# Patient Record
Sex: MALE | Race: WHITE | Employment: FULL TIME | URBAN - METROPOLITAN AREA
[De-identification: names, ages, dates, MRNs, and addresses within clinical notes are randomized per-mention and may not be internally consistent; named-entity substitution may affect disease eponyms.]

---

## 2022-04-26 ENCOUNTER — HOSPITAL ENCOUNTER (EMERGENCY)
Age: 47
Discharge: HOME | End: 2022-04-26
Attending: EMERGENCY MEDICINE
Payer: COMMERCIAL

## 2022-04-26 VITALS
DIASTOLIC BLOOD PRESSURE: 74 MMHG | HEART RATE: 98 BPM | RESPIRATION RATE: 16 BRPM | WEIGHT: 280 LBS | OXYGEN SATURATION: 98 % | HEIGHT: 72 IN | BODY MASS INDEX: 37.93 KG/M2 | TEMPERATURE: 98.2 F | SYSTOLIC BLOOD PRESSURE: 126 MMHG

## 2022-04-26 DIAGNOSIS — L02.31 LEFT BUTTOCK ABSCESS: Primary | ICD-10-CM

## 2022-04-26 PROCEDURE — 99283 EMERGENCY DEPT VISIT LOW MDM: CPT

## 2022-04-26 RX ORDER — SULFAMETHOXAZOLE AND TRIMETHOPRIM 800; 160 MG/1; MG/1
1 TABLET ORAL 2 TIMES DAILY
Qty: 20 TABLET | Refills: 0 | Status: ON HOLD | OUTPATIENT
Start: 2022-04-26 | End: 2022-05-02 | Stop reason: HOSPADM

## 2022-04-26 ASSESSMENT — PAIN DESCRIPTION - LOCATION: LOCATION: RECTUM

## 2022-04-26 ASSESSMENT — PAIN DESCRIPTION - FREQUENCY: FREQUENCY: CONTINUOUS

## 2022-04-26 ASSESSMENT — PAIN DESCRIPTION - PAIN TYPE: TYPE: ACUTE PAIN

## 2022-04-26 ASSESSMENT — LIFESTYLE VARIABLES: HOW OFTEN DO YOU HAVE A DRINK CONTAINING ALCOHOL: NEVER

## 2022-04-26 ASSESSMENT — PAIN SCALES - GENERAL: PAINLEVEL_OUTOF10: 5

## 2022-04-26 ASSESSMENT — PAIN DESCRIPTION - ORIENTATION: ORIENTATION: MID

## 2022-04-26 NOTE — ED NOTES
Reviewed discharge instructions with patient and family. All voice understanding/deny questions. Wheelchair offered, declines. Ambulates without difficulty.        Simone Barth RN  04/26/22 [id number]

## 2022-04-26 NOTE — ED PROVIDER NOTES
EMERGENCY DEPARTMENT ENCOUNTER    Patient: Karen Clark  MRN: [id number]  : 1975  Date of Evaluation: 2022  ED Provider:  Khushboo Coates MD    CHIEF COMPLAINT  Chief Complaint   Patient presents with    Hemorrhoids     rectal bleeding, and pain       HPI  Karen Clark is a 55 y.o. male who presents with an area of pain, redness and swelling localized to the left buttock which has developed over the last several days. .  The duration has been constant since the onset. The pain is mild to moderate in severity and aggravated with palpation. There are no alleviating factors. Denies any other associated symptoms or complaints or concerns. REVIEW OF SYSTEMS    Constitutional: negative for fever, chills  Neurological: negative for HA, focal weakness, loss of sensation  Ophthalmic: negative for vision change  ENT: negative for congestion, rhinorrhea  Cardiovascular: negative for chest pain  Respiratory: negative for SOB, cough  GI: negative for abdominal pain, nausea, vomiting, diarrhea, constipation  : negative for dysuria, hematuria  Musculoskeletal: negative for myalgias, decreased ROM, joint swelling  Dermatological: negative for itching  Heme: Negative for bleeding, bruising      PAST MEDICAL HISTORY  History reviewed. No pertinent past medical history. CURRENT MEDICATIONS  [unfilled]    ALLERGIES  No Known Allergies    SURGICAL HISTORY  History reviewed. No pertinent surgical history. FAMILY HISTORY  History reviewed. No pertinent family history.     SOCIAL HISTORY  Social History     Socioeconomic History    Marital status: Single     Spouse name: None    Number of children: None    Years of education: None    Highest education level: None   Occupational History    None   Tobacco Use    Smoking status: Current Every Day Smoker     Packs/day: 0.50     Types: E-Cigarettes, Cigarettes    Smokeless tobacco: None   Vaping Use    Vaping Use: Never used Substance and Sexual Activity    Alcohol use: Yes    Drug use: No    Sexual activity: Yes   Other Topics Concern    None   Social History Narrative    None     Social Determinants of Health     Financial Resource Strain:     Difficulty of Paying Living Expenses: Not on file   Food Insecurity:     Worried About Running Out of Food in the Last Year: Not on file    Kem of Food in the Last Year: Not on file   Transportation Needs:     Lack of Transportation (Medical): Not on file    Lack of Transportation (Non-Medical):  Not on file   Physical Activity:     Days of Exercise per Week: Not on file    Minutes of Exercise per Session: Not on file   Stress:     Feeling of Stress : Not on file   Social Connections:     Frequency of Communication with Friends and Family: Not on file    Frequency of Social Gatherings with Friends and Family: Not on file    Attends Yarsanism Services: Not on file    Active Member of [address] [place] [address] [employer] or Organizations: Not on file    Attends Club or Organization Meetings: Not on file    Marital Status: Not on file   Intimate Partner Violence:     Fear of Current or Ex-Partner: Not on file    Emotionally Abused: Not on file    Physically Abused: Not on file    Sexually Abused: Not on file   Housing Stability:     Unable to Pay for Housing in the Last Year: Not on file    Number of [place] in the Last Year: Not on file    Unstable Housing in the Last Year: Not on file         **Past medical, family and social histories, and nursing notes reviewed and verified by me**      PHYSICAL EXAM  VITAL SIGNS:   ED Triage Vitals   Enc Vitals Group      BP 04/26/22 0735 126/74      Pulse 04/26/22 0735 120      Resp 04/26/22 0735 16      Temp 04/26/22 0734 98.2 °F (36.8 °C)      Temp Source 04/26/22 0734 Oral      SpO2 04/26/22 0735 98 %      Weight 04/26/22 0740 280 lb (127 kg)      Height 04/26/22 0740 6' (1.829 m)      Head Circumference --       Peak Flow --       Pain Score -- Pain Loc --       Pain Edu? --       Excl. in [address] during ED course were reviewed and are as charted. Constitutional: Minimal distress, Non-toxic appearance  Eyes: Conjunctiva normal, No discharge  HENT: Normocephalic, Atraumatic, oropharynx moist  Neck: Supple, no stridor, no grossly visible or palpable masses  Cardiovascular: Regular rate and rhythm, No murmurs, No rubs, No gallops  Pulmonary/Chest: Normal breath sounds, No respiratory distress or accessory muscle use, No wheezing, crackles or rhonchi. Abdomen: Soft, nondistended and nonrigid, No tenderness or peritoneal signs, No masses  Rectal (performed with nurse present as chaperone): There is a 3 cm x 3 cm area of erythema with about half centimeter sosa the center of this with some central fluctuance on the left buttock just slightly going into the gluteal cleft. Just gentle pressure caused this to open up and drain on its own about 2 cc of purulent fluid. There is no crepitus. There is no extension of this towards the anus or into the perineum or elsewhere. Otherwise normal appearance of the anus. No internal or external masses noted. No evidence for fissures. Normal rectal tone. Stool is normal in color  Back: No midline point tenderness, No paraspinous muscle tenderness. No CVA tenderness  Extremities: No gross deformities, no edema, no tenderness  Neurologic: Normal motor function, Normal sensory function, No focal deficits  Skin: Female fluctuance of the left buttock as noted above under rectal exam, otherwise skin elsewhere is warm, Dry, No erythema, No rash, No cyanosis, No mottling  Psychiatric: Alert and oriented x3, Affect normal        RADIOLOGY/PROCEDURES/LABS/MEDICATIONS ADMINISTERED:    I have reviewed and interpreted all of the currently available lab results from this visit (if applicable):  No results found for this visit on 04/26/22.        ABNORMAL LABS:  Labs Reviewed - No data to display      IMAGING STUDIES ORDERED:  ED NURSING COMMUNICATION      No orders to display         MEDICATIONS ADMINISTERED:  Medications - No data to display        COURSE & MEDICAL DECISION MAKING  Last vitals: /74   Pulse 98   Temp 98.2 °F (36.8 °C) (Oral)   Resp 16   Ht 6' (1.829 m)   Wt 280 lb (127 kg)   SpO2 98%   BMI 37.97 kg/m²     55year-old male with small abscess to left buttock. No clinical evidence that would suggest perianal perirectal abscess or perineal abscess. No clinical evidence to suggest acute necrotizing infection. The abscess began to drain on its own with just gentle pressure and did not require further incision and drainage. Differential diagnoses included, but were not limited to, cellulitis, erysipelas, necrotizing fasciitis,  aseptic cyst, and malignancy. I will add Bactrim. Additional workup and treatment in the ED as documented above. Patient reassured and will be discharged home. I have explained to the patient in appropriate terminology our work-up in the ED and their diagnosis. I have also given anticipatory guidance and expectant management of their condition as an outpatient as per my custom. The patient was given clear discharge and follow-up instructions including return to the ER immediately for worsening concerns. The patient has been advised to follow-up with their primary care physician and/or referred physician in the next two to three days or sooner if worsening and to return to the ER immediately as above with any concerns. I provided the patient counseling with regard to my customary list of strict return precautions as well as return precautions specific to the cause for today's emergency department visit. The patient will return under these provided conditions, but should also return for new concerns or further worsening. Pt and/or family understand and agree with plan. Clinical Impression:  1.  Left buttock abscess        Disposition referral (if applicable): Your Primary care provider    Schedule an appointment as soon as possible for a visit       [facility] Emergency Department  [address] [phone]  [phone]    If symptoms worsen      Disposition medications (if applicable):  Discharge Medication List as of 4/26/2022  8:19 AM      START taking these medications    Details   sulfamethoxazole-trimethoprim (BACTRIM DS) 800-160 MG per tablet Take 1 tablet by mouth 2 times daily for 10 days, Disp-20 tablet, R-0Print             ED Provider Disposition Time  DISPOSITION Decision To Discharge 04/26/2022 07:48:54 AM          Electronically signed by: Nunu Deleon M.D., 4/26/2022 8:44 AM      This dictation was created with voice recognition software. While attempts have been made to review the dictation as it is transcribed, on occasion the spoken word can be misinterpreted by the technology leading to omissions or inappropriate words, phrases or sentences.        Kimberly Mello MD  04/26/22 2842

## 2022-04-29 ENCOUNTER — APPOINTMENT (OUTPATIENT)
Dept: CT IMAGING | Age: 47
DRG: 854 | End: 2022-04-29
Payer: COMMERCIAL

## 2022-04-29 ENCOUNTER — HOSPITAL ENCOUNTER (INPATIENT)
Age: 47
LOS: 2 days | Discharge: HOME HEALTH | DRG: 854 | End: 2022-05-02
Attending: EMERGENCY MEDICINE | Admitting: HOSPITALIST
Payer: COMMERCIAL

## 2022-04-29 DIAGNOSIS — L03.317 CELLULITIS OF BUTTOCK: Primary | ICD-10-CM

## 2022-04-29 DIAGNOSIS — L02.91 ABSCESS: ICD-10-CM

## 2022-04-29 DIAGNOSIS — R73.9 HYPERGLYCEMIA: ICD-10-CM

## 2022-04-29 LAB
ALBUMIN SERPL-MCNC: 3.6 GM/DL (ref 3.4–5)
ALP BLD-CCNC: 140 IU/L (ref 40–129)
ALT SERPL-CCNC: 21 U/L (ref 10–40)
ANION GAP SERPL CALCULATED.3IONS-SCNC: 16 MMOL/L (ref 4–16)
AST SERPL-CCNC: 14 IU/L (ref 15–37)
BASOPHILS ABSOLUTE: 0.1 K/CU MM
BASOPHILS RELATIVE PERCENT: 0.6 % (ref 0–1)
BILIRUB SERPL-MCNC: 0.5 MG/DL (ref 0–1)
BUN BLDV-MCNC: 14 MG/DL (ref 6–23)
CALCIUM SERPL-MCNC: 8.8 MG/DL (ref 8.3–10.6)
CHLORIDE BLD-SCNC: 99 MMOL/L (ref 99–110)
CO2: 17 MMOL/L (ref 21–32)
CREAT SERPL-MCNC: 0.7 MG/DL (ref 0.9–1.3)
DIFFERENTIAL TYPE: ABNORMAL
EOSINOPHILS ABSOLUTE: 0.1 K/CU MM
EOSINOPHILS RELATIVE PERCENT: 0.5 % (ref 0–3)
GFR AFRICAN AMERICAN: >60 ML/MIN/1.73M2
GFR NON-AFRICAN AMERICAN: >60 ML/MIN/1.73M2
GLUCOSE BLD-MCNC: 439 MG/DL (ref 70–99)
HCT VFR BLD CALC: 46.8 % (ref 42–52)
HEMOGLOBIN: 15.6 GM/DL (ref 13.5–18)
IMMATURE NEUTROPHIL %: 0.8 % (ref 0–0.43)
LACTATE: 1.5 MMOL/L (ref 0.4–2)
LYMPHOCYTES ABSOLUTE: 2.3 K/CU MM
LYMPHOCYTES RELATIVE PERCENT: 13.9 % (ref 24–44)
MCH RBC QN AUTO: 29.5 PG (ref 27–31)
MCHC RBC AUTO-ENTMCNC: 33.3 % (ref 32–36)
MCV RBC AUTO: 88.6 FL (ref 78–100)
MONOCYTES ABSOLUTE: 1.3 K/CU MM
MONOCYTES RELATIVE PERCENT: 8 % (ref 0–4)
NUCLEATED RBC %: 0 %
PDW BLD-RTO: 11.8 % (ref 11.7–14.9)
PLATELET # BLD: 315 K/CU MM (ref 140–440)
PMV BLD AUTO: 10.5 FL (ref 7.5–11.1)
POTASSIUM SERPL-SCNC: 4 MMOL/L (ref 3.5–5.1)
RBC # BLD: 5.28 M/CU MM (ref 4.6–6.2)
SEGMENTED NEUTROPHILS ABSOLUTE COUNT: 12.7 K/CU MM
SEGMENTED NEUTROPHILS RELATIVE PERCENT: 76.2 % (ref 36–66)
SODIUM BLD-SCNC: 132 MMOL/L (ref 135–145)
TOTAL IMMATURE NEUTOROPHIL: 0.14 K/CU MM
TOTAL NUCLEATED RBC: 0 K/CU MM
TOTAL PROTEIN: 7.2 GM/DL (ref 6.4–8.2)
WBC # BLD: 16.7 K/CU MM (ref 4–10.5)

## 2022-04-29 PROCEDURE — 10061 I&D ABSCESS COMP/MULTIPLE: CPT

## 2022-04-29 PROCEDURE — 87040 BLOOD CULTURE FOR BACTERIA: CPT

## 2022-04-29 PROCEDURE — 2580000003 HC RX 258: Performed by: EMERGENCY MEDICINE

## 2022-04-29 PROCEDURE — 80053 COMPREHEN METABOLIC PANEL: CPT

## 2022-04-29 PROCEDURE — 99285 EMERGENCY DEPT VISIT HI MDM: CPT

## 2022-04-29 PROCEDURE — 96375 TX/PRO/DX INJ NEW DRUG ADDON: CPT

## 2022-04-29 PROCEDURE — 96365 THER/PROPH/DIAG IV INF INIT: CPT

## 2022-04-29 PROCEDURE — 6360000002 HC RX W HCPCS: Performed by: EMERGENCY MEDICINE

## 2022-04-29 PROCEDURE — 93005 ELECTROCARDIOGRAM TRACING: CPT | Performed by: EMERGENCY MEDICINE

## 2022-04-29 PROCEDURE — 83605 ASSAY OF LACTIC ACID: CPT

## 2022-04-29 PROCEDURE — 96361 HYDRATE IV INFUSION ADD-ON: CPT

## 2022-04-29 PROCEDURE — 85025 COMPLETE CBC W/AUTO DIFF WBC: CPT

## 2022-04-29 RX ORDER — ONDANSETRON 2 MG/ML
4 INJECTION INTRAMUSCULAR; INTRAVENOUS ONCE
Status: DISCONTINUED | OUTPATIENT
Start: 2022-04-29 | End: 2022-05-02 | Stop reason: HOSPADM

## 2022-04-29 RX ORDER — 0.9 % SODIUM CHLORIDE 0.9 %
1000 INTRAVENOUS SOLUTION INTRAVENOUS ONCE
Status: COMPLETED | OUTPATIENT
Start: 2022-04-29 | End: 2022-04-30

## 2022-04-29 RX ORDER — MORPHINE SULFATE 4 MG/ML
4 INJECTION, SOLUTION INTRAMUSCULAR; INTRAVENOUS ONCE
Status: DISCONTINUED | OUTPATIENT
Start: 2022-04-29 | End: 2022-05-02 | Stop reason: HOSPADM

## 2022-04-29 RX ADMIN — CEFEPIME HYDROCHLORIDE 2000 MG: 2 INJECTION, POWDER, FOR SOLUTION INTRAVENOUS at 23:46

## 2022-04-29 RX ADMIN — SODIUM CHLORIDE 1000 ML: 9 INJECTION, SOLUTION INTRAVENOUS at 23:10

## 2022-04-29 ASSESSMENT — ENCOUNTER SYMPTOMS
SHORTNESS OF BREATH: 0
EYE PAIN: 0
BACK PAIN: 0
ABDOMINAL PAIN: 0
COUGH: 0
EYE DISCHARGE: 0
NAUSEA: 0
VOMITING: 0
SORE THROAT: 0
RHINORRHEA: 0

## 2022-04-29 ASSESSMENT — PAIN - FUNCTIONAL ASSESSMENT: PAIN_FUNCTIONAL_ASSESSMENT: 0-10

## 2022-04-29 ASSESSMENT — PAIN DESCRIPTION - LOCATION: LOCATION: BUTTOCKS

## 2022-04-29 ASSESSMENT — PAIN SCALES - GENERAL: PAINLEVEL_OUTOF10: 7

## 2022-04-30 ENCOUNTER — APPOINTMENT (OUTPATIENT)
Dept: CT IMAGING | Age: 47
DRG: 854 | End: 2022-04-30
Payer: COMMERCIAL

## 2022-04-30 PROBLEM — L02.31 ABSCESS OF BUTTOCK, LEFT: Status: ACTIVE | Noted: 2022-04-30

## 2022-04-30 LAB
GLUCOSE BLD-MCNC: 245 MG/DL (ref 70–99)
GLUCOSE BLD-MCNC: 251 MG/DL (ref 70–99)
GLUCOSE BLD-MCNC: 255 MG/DL (ref 70–99)
GLUCOSE BLD-MCNC: 325 MG/DL (ref 70–99)

## 2022-04-30 PROCEDURE — 99221 1ST HOSP IP/OBS SF/LOW 40: CPT | Performed by: SURGERY

## 2022-04-30 PROCEDURE — 6370000000 HC RX 637 (ALT 250 FOR IP): Performed by: HOSPITALIST

## 2022-04-30 PROCEDURE — 87075 CULTR BACTERIA EXCEPT BLOOD: CPT

## 2022-04-30 PROCEDURE — 6360000004 HC RX CONTRAST MEDICATION: Performed by: EMERGENCY MEDICINE

## 2022-04-30 PROCEDURE — 94761 N-INVAS EAR/PLS OXIMETRY MLT: CPT

## 2022-04-30 PROCEDURE — 2580000003 HC RX 258: Performed by: EMERGENCY MEDICINE

## 2022-04-30 PROCEDURE — 83036 HEMOGLOBIN GLYCOSYLATED A1C: CPT

## 2022-04-30 PROCEDURE — 46040 I&D ISCHIORCT&/PERIRCT ABSC: CPT | Performed by: SURGERY

## 2022-04-30 PROCEDURE — 2500000003 HC RX 250 WO HCPCS: Performed by: EMERGENCY MEDICINE

## 2022-04-30 PROCEDURE — 82962 GLUCOSE BLOOD TEST: CPT

## 2022-04-30 PROCEDURE — 6370000000 HC RX 637 (ALT 250 FOR IP): Performed by: INTERNAL MEDICINE

## 2022-04-30 PROCEDURE — 6360000002 HC RX W HCPCS: Performed by: EMERGENCY MEDICINE

## 2022-04-30 PROCEDURE — 1200000000 HC SEMI PRIVATE

## 2022-04-30 PROCEDURE — 96374 THER/PROPH/DIAG INJ IV PUSH: CPT

## 2022-04-30 PROCEDURE — 2500000003 HC RX 250 WO HCPCS: Performed by: SURGERY

## 2022-04-30 PROCEDURE — 6360000002 HC RX W HCPCS: Performed by: HOSPITALIST

## 2022-04-30 PROCEDURE — 74177 CT ABD & PELVIS W/CONTRAST: CPT

## 2022-04-30 PROCEDURE — 2580000003 HC RX 258: Performed by: HOSPITALIST

## 2022-04-30 PROCEDURE — 0D9P0ZZ DRAINAGE OF RECTUM, OPEN APPROACH: ICD-10-PCS | Performed by: SURGERY

## 2022-04-30 PROCEDURE — 87077 CULTURE AEROBIC IDENTIFY: CPT

## 2022-04-30 PROCEDURE — 87070 CULTURE OTHR SPECIMN AEROBIC: CPT

## 2022-04-30 PROCEDURE — 87205 SMEAR GRAM STAIN: CPT

## 2022-04-30 RX ORDER — ENOXAPARIN SODIUM 100 MG/ML
30 INJECTION SUBCUTANEOUS 2 TIMES DAILY
Status: DISCONTINUED | OUTPATIENT
Start: 2022-04-30 | End: 2022-05-02 | Stop reason: HOSPADM

## 2022-04-30 RX ORDER — NICOTINE POLACRILEX 4 MG
15 LOZENGE BUCCAL PRN
Status: DISCONTINUED | OUTPATIENT
Start: 2022-04-30 | End: 2022-04-30 | Stop reason: ALTCHOICE

## 2022-04-30 RX ORDER — DEXTROSE MONOHYDRATE 50 MG/ML
100 INJECTION, SOLUTION INTRAVENOUS PRN
Status: DISCONTINUED | OUTPATIENT
Start: 2022-04-30 | End: 2022-04-30 | Stop reason: SDUPTHER

## 2022-04-30 RX ORDER — INSULIN LISPRO 100 [IU]/ML
0-3 INJECTION, SOLUTION INTRAVENOUS; SUBCUTANEOUS NIGHTLY
Status: DISCONTINUED | OUTPATIENT
Start: 2022-04-30 | End: 2022-05-01

## 2022-04-30 RX ORDER — DEXTROSE MONOHYDRATE 25 G/50ML
12.5 INJECTION, SOLUTION INTRAVENOUS PRN
Status: DISCONTINUED | OUTPATIENT
Start: 2022-04-30 | End: 2022-04-30 | Stop reason: RX

## 2022-04-30 RX ORDER — DEXTROSE MONOHYDRATE 25 G/50ML
12.5 INJECTION, SOLUTION INTRAVENOUS PRN
Status: DISCONTINUED | OUTPATIENT
Start: 2022-04-30 | End: 2022-04-30 | Stop reason: CLARIF

## 2022-04-30 RX ORDER — INSULIN LISPRO 100 [IU]/ML
0-6 INJECTION, SOLUTION INTRAVENOUS; SUBCUTANEOUS
Status: DISCONTINUED | OUTPATIENT
Start: 2022-04-30 | End: 2022-05-01

## 2022-04-30 RX ORDER — LIDOCAINE HYDROCHLORIDE AND EPINEPHRINE BITARTRATE 20; .01 MG/ML; MG/ML
20 INJECTION, SOLUTION SUBCUTANEOUS ONCE
Status: COMPLETED | OUTPATIENT
Start: 2022-04-30 | End: 2022-04-30

## 2022-04-30 RX ORDER — MORPHINE SULFATE 2 MG/ML
2 INJECTION, SOLUTION INTRAMUSCULAR; INTRAVENOUS EVERY 4 HOURS PRN
Status: DISCONTINUED | OUTPATIENT
Start: 2022-04-30 | End: 2022-05-02 | Stop reason: HOSPADM

## 2022-04-30 RX ORDER — ACETAMINOPHEN 650 MG/1
650 SUPPOSITORY RECTAL EVERY 6 HOURS PRN
Status: DISCONTINUED | OUTPATIENT
Start: 2022-04-30 | End: 2022-05-02 | Stop reason: HOSPADM

## 2022-04-30 RX ORDER — LIDOCAINE HYDROCHLORIDE 10 MG/ML
10 INJECTION, SOLUTION EPIDURAL; INFILTRATION; INTRACAUDAL; PERINEURAL ONCE
Status: COMPLETED | OUTPATIENT
Start: 2022-04-30 | End: 2022-04-30

## 2022-04-30 RX ORDER — NICOTINE POLACRILEX 4 MG
15 LOZENGE BUCCAL PRN
Status: DISCONTINUED | OUTPATIENT
Start: 2022-04-30 | End: 2022-04-30 | Stop reason: SDUPTHER

## 2022-04-30 RX ORDER — POLYETHYLENE GLYCOL 3350 17 G/17G
17 POWDER, FOR SOLUTION ORAL DAILY PRN
Status: DISCONTINUED | OUTPATIENT
Start: 2022-04-30 | End: 2022-05-02 | Stop reason: HOSPADM

## 2022-04-30 RX ORDER — FAMOTIDINE 20 MG/1
20 TABLET, FILM COATED ORAL 2 TIMES DAILY
Status: DISCONTINUED | OUTPATIENT
Start: 2022-04-30 | End: 2022-05-02 | Stop reason: HOSPADM

## 2022-04-30 RX ORDER — SODIUM CHLORIDE 9 MG/ML
INJECTION, SOLUTION INTRAVENOUS PRN
Status: DISCONTINUED | OUTPATIENT
Start: 2022-04-30 | End: 2022-05-02 | Stop reason: HOSPADM

## 2022-04-30 RX ORDER — ACETAMINOPHEN 325 MG/1
650 TABLET ORAL EVERY 6 HOURS PRN
Status: DISCONTINUED | OUTPATIENT
Start: 2022-04-30 | End: 2022-05-02 | Stop reason: HOSPADM

## 2022-04-30 RX ORDER — ONDANSETRON 2 MG/ML
4 INJECTION INTRAMUSCULAR; INTRAVENOUS EVERY 6 HOURS PRN
Status: DISCONTINUED | OUTPATIENT
Start: 2022-04-30 | End: 2022-05-02 | Stop reason: HOSPADM

## 2022-04-30 RX ORDER — DEXTROSE MONOHYDRATE 50 MG/ML
100 INJECTION, SOLUTION INTRAVENOUS PRN
Status: DISCONTINUED | OUTPATIENT
Start: 2022-04-30 | End: 2022-05-01 | Stop reason: SDUPTHER

## 2022-04-30 RX ORDER — SODIUM CHLORIDE 0.9 % (FLUSH) 0.9 %
5-40 SYRINGE (ML) INJECTION EVERY 12 HOURS SCHEDULED
Status: DISCONTINUED | OUTPATIENT
Start: 2022-04-30 | End: 2022-05-02 | Stop reason: HOSPADM

## 2022-04-30 RX ORDER — ONDANSETRON 4 MG/1
4 TABLET, ORALLY DISINTEGRATING ORAL EVERY 8 HOURS PRN
Status: DISCONTINUED | OUTPATIENT
Start: 2022-04-30 | End: 2022-05-02 | Stop reason: HOSPADM

## 2022-04-30 RX ORDER — SODIUM CHLORIDE 9 MG/ML
INJECTION, SOLUTION INTRAVENOUS CONTINUOUS
Status: DISCONTINUED | OUTPATIENT
Start: 2022-04-30 | End: 2022-05-02 | Stop reason: HOSPADM

## 2022-04-30 RX ORDER — SODIUM CHLORIDE 0.9 % (FLUSH) 0.9 %
5-40 SYRINGE (ML) INJECTION PRN
Status: DISCONTINUED | OUTPATIENT
Start: 2022-04-30 | End: 2022-05-02 | Stop reason: HOSPADM

## 2022-04-30 RX ADMIN — SODIUM CHLORIDE: 9 INJECTION, SOLUTION INTRAVENOUS at 21:24

## 2022-04-30 RX ADMIN — METFORMIN HYDROCHLORIDE 500 MG: 500 TABLET ORAL at 15:27

## 2022-04-30 RX ADMIN — INSULIN LISPRO 4 UNITS: 100 INJECTION, SOLUTION INTRAVENOUS; SUBCUTANEOUS at 18:02

## 2022-04-30 RX ADMIN — MORPHINE SULFATE 2 MG: 2 INJECTION, SOLUTION INTRAMUSCULAR; INTRAVENOUS at 10:48

## 2022-04-30 RX ADMIN — CEFEPIME HYDROCHLORIDE 2000 MG: 2 INJECTION, POWDER, FOR SOLUTION INTRAVENOUS at 23:04

## 2022-04-30 RX ADMIN — LIDOCAINE HYDROCHLORIDE 10 ML: 10 INJECTION, SOLUTION EPIDURAL; INFILTRATION; INTRACAUDAL; PERINEURAL at 01:57

## 2022-04-30 RX ADMIN — SODIUM CHLORIDE: 9 INJECTION, SOLUTION INTRAVENOUS at 05:16

## 2022-04-30 RX ADMIN — FAMOTIDINE 20 MG: 20 TABLET ORAL at 08:55

## 2022-04-30 RX ADMIN — FAMOTIDINE 20 MG: 20 TABLET ORAL at 21:25

## 2022-04-30 RX ADMIN — INSULIN LISPRO 3 UNITS: 100 INJECTION, SOLUTION INTRAVENOUS; SUBCUTANEOUS at 08:56

## 2022-04-30 RX ADMIN — INSULIN LISPRO 1 UNITS: 100 INJECTION, SOLUTION INTRAVENOUS; SUBCUTANEOUS at 21:28

## 2022-04-30 RX ADMIN — LIDOCAINE HYDROCHLORIDE,EPINEPHRINE BITARTRATE 20 ML: 20; .01 INJECTION, SOLUTION INFILTRATION; PERINEURAL at 10:53

## 2022-04-30 RX ADMIN — ACETAMINOPHEN 650 MG: 325 TABLET ORAL at 19:43

## 2022-04-30 RX ADMIN — CEFEPIME HYDROCHLORIDE 2000 MG: 2 INJECTION, POWDER, FOR SOLUTION INTRAVENOUS at 11:02

## 2022-04-30 RX ADMIN — ENOXAPARIN SODIUM 30 MG: 100 INJECTION SUBCUTANEOUS at 21:25

## 2022-04-30 RX ADMIN — VANCOMYCIN HYDROCHLORIDE 2250 MG: 1 INJECTION, POWDER, LYOPHILIZED, FOR SOLUTION INTRAVENOUS at 01:32

## 2022-04-30 RX ADMIN — IOPAMIDOL 75 ML: 755 INJECTION, SOLUTION INTRAVENOUS at 00:45

## 2022-04-30 RX ADMIN — ENOXAPARIN SODIUM 30 MG: 100 INJECTION SUBCUTANEOUS at 08:55

## 2022-04-30 RX ADMIN — VANCOMYCIN HYDROCHLORIDE 1500 MG: 5 INJECTION, POWDER, LYOPHILIZED, FOR SOLUTION INTRAVENOUS at 15:30

## 2022-04-30 ASSESSMENT — PAIN DESCRIPTION - LOCATION
LOCATION: HEAD
LOCATION: BUTTOCKS

## 2022-04-30 ASSESSMENT — PAIN DESCRIPTION - PAIN TYPE: TYPE: ACUTE PAIN

## 2022-04-30 ASSESSMENT — PAIN SCALES - GENERAL
PAINLEVEL_OUTOF10: 4
PAINLEVEL_OUTOF10: 3
PAINLEVEL_OUTOF10: 8
PAINLEVEL_OUTOF10: 5

## 2022-04-30 ASSESSMENT — PAIN DESCRIPTION - DESCRIPTORS
DESCRIPTORS: OTHER (COMMENT)
DESCRIPTORS: SHARP

## 2022-04-30 ASSESSMENT — PAIN DESCRIPTION - FREQUENCY: FREQUENCY: INTERMITTENT

## 2022-04-30 NOTE — CONSULTS
Department of General Surgery   Surgical Service Dr. Caesar Singh   Consult Note    Date of Consult: 4/30/22    Reason for Consult:  Perianal abscess  Requesting Physician:  Dr Rose Almonte:  Buttock pain    History Obtained From:  patient    HISTORY OF PRESENT ILLNESS:    The patient is a 55 y.o. male who presented with several days of buttock pain. He had an abscess lanced in the ED 3 days prior. He has been on antibiotics without improvement. He reports worsening pain and redness. Positive for fever. Markedly elevated glucose. The site was again lanced in the ER, packing was not placed. Haydee Her denies history of DM but does have markedly elevated glucose. Past Medical History:    History reviewed. No pertinent past medical history. Past Surgical History:    History reviewed. No pertinent surgical history.     Current Medications:   Current Facility-Administered Medications   Medication Dose Route Frequency Provider Last Rate Last Admin    sodium chloride flush 0.9 % injection 5-40 mL  5-40 mL IntraVENous 2 times per day Gus May MD        sodium chloride flush 0.9 % injection 5-40 mL  5-40 mL IntraVENous PRN Gus May MD        0.9 % sodium chloride infusion   IntraVENous PRN Gus May MD        enoxaparin Sodium (LOVENOX) injection 30 mg  30 mg SubCUTAneous BID Gus May MD        ondansetron (ZOFRAN-ODT) disintegrating tablet 4 mg  4 mg Oral Q8H PRN Gus May MD        Or    ondansetron (ZOFRAN) injection 4 mg  4 mg IntraVENous Q6H PRN Gus May MD        polyethylene glycol (GLYCOLAX) packet 17 g  17 g Oral Daily PRN Gus May MD        acetaminophen (TYLENOL) tablet 650 mg  650 mg Oral Q6H PRN Gus May MD        Or    acetaminophen (TYLENOL) suppository 650 mg  650 mg Rectal Q6H PRN Gus Mya MD        0.9 % sodium chloride infusion   IntraVENous Continuous Gus May  mL/hr at 04/30/22 0516 New Bag at 04/30/22 0516    famotidine (PEPCID) tablet 20 mg  20 mg Oral BID Gus May MD        cefepime (MAXIPIME) 2000 mg IVPB minibag  2,000 mg IntraVENous Q12H Gus May MD        morphine (PF) injection 2 mg  2 mg IntraVENous Q4H PRN Gus May MD        insulin lispro (HUMALOG) injection vial 0-6 Units  0-6 Units SubCUTAneous TID WC Gus May MD        insulin lispro (HUMALOG) injection vial 0-3 Units  0-3 Units SubCUTAneous Nightly Gus May MD        dextrose 50 % IV solution  12.5 g IntraVENous PRN Gus May MD        glucagon (rDNA) injection 1 mg  1 mg IntraMUSCular PRN Gus May MD        dextrose 5 % solution  100 mL/hr IntraVENous PRN Gus May MD        glucose chewable tablet 16 g  16 g Oral PRN Gus May MD        vancomycin (VANCOCIN) 1,500 mg in dextrose 5 % 500 mL IVPB  1,500 mg IntraVENous Q12H Gus May MD        lidocaine-EPINEPHrine 2 percent-1:532102 injection 20 mL  20 mL IntraDERmal Once Noe Street MD        morphine sulfate (PF) injection 4 mg  4 mg IntraVENous Once Gus May MD        ondansetron [facility]) injection 4 mg  4 mg IntraVENous Once Gus May MD           Allergies:  Patient has no known allergies. Social History:   Social History     Socioeconomic History    Marital status: Single     Spouse name: None    Number of children: None    Years of education: None    Highest education level: None   Occupational History    None   Tobacco Use    Smoking status: Current Every Day Smoker     Packs/day: 0.50     Types: E-Cigarettes, Cigarettes    Smokeless tobacco: None   Vaping Use    Vaping Use: Never used   Substance and Sexual Activity    Alcohol use:  Yes    Drug use: No    Sexual activity: Yes   Other Topics Concern    None   Social History Narrative    None     Social Determinants of Health     Financial Resource Strain:     Difficulty of Paying Living Expenses: Not on file   Food Insecurity:     Worried About Running Out of Food in the Last Year: Not on file    Ran Out of Food in the Last Year: Not on file   Transportation Needs:     Lack of Transportation (Medical): Not on file    Lack of Transportation (Non-Medical): Not on file   Physical Activity:     Days of Exercise per Week: Not on file    Minutes of Exercise per Session: Not on file   Stress:     Feeling of Stress : Not on file   Social Connections:     Frequency of Communication with Friends and Family: Not on file    Frequency of Social Gatherings with Friends and Family: Not on file    Attends Restoration Services: Not on file    Active Member of [address] [place] [address] [employer] or Organizations: Not on file    Attends Club or Organization Meetings: Not on file    Marital Status: Not on file   Intimate Partner Violence:     Fear of Current or Ex-Partner: Not on file    Emotionally Abused: Not on file    Physically Abused: Not on file    Sexually Abused: Not on file   Housing Stability:     Unable to Pay for Housing in the Last Year: Not on file    Number of [place] in the Last Year: Not on file    Unstable Housing in the Last Year: Not on file       Family History:   History reviewed. No pertinent family history. REVIEW OF SYSTEMS:    Constitutional: positive for fever. HENT: Negative for congestion. Negative for rhinorrhea. Respiratory: Negative for cough. Negative for shortness of breath. Negative for wheezing. Cardiovascular:tachy on admisison  Gastrointestinal:  Negative for constipation. Negative for diarrhea. Negative for nausea and vomiting. Genitourinary: Negative for difficulty urinating. Neurological: Negative for dizziness, syncope and numbness. Hematological: Does not bruise/bleed easily.        PHYSICAL EXAM:  Vitals:    04/29/22 2217 04/29/22 2218 04/30/22 0415 04/30/22 0508   BP:  (!) 121/91 (!) 147/88 136/71   Pulse:  150  111   Resp:  22  18   Temp:  97.5 °F (36.4 °C)  98.3 °F (36.8 °C)   TempSrc:  Oral  Oral   SpO2:  96% 95% 97%   Weight: 280 lb (127 kg) Height: 6' (1.829 m)   6' (1.829 m)       Physical Exam  General: awake, alert, in no acute distress  HEENT: mucous membranes moist  Respiratory: normal effort, no wheezes appreciated  CV: appears well perfused  Abdomen: Soft, non-tender, non-distended. No guarding or rebound tenderness. Buttock: ~10x4cm of fluctuance present on the left buttock with small hold draining purulence, induration and erythema present. Skin: warm and dry  Extremities: atraumatic  Neuro: no focal deficits noted  Psych: mood normal        DATA:    Lab Results   Component Value Date    WBC 16.7 (H) 04/29/2022    HGB 15.6 04/29/2022    HCT 46.8 04/29/2022    MCV 88.6 04/29/2022     04/29/2022     Lab Results   Component Value Date     04/29/2022    K 4.0 04/29/2022    CL 99 04/29/2022    CO2 17 04/29/2022    BUN 14 04/29/2022    CREATININE 0.7 04/29/2022    GLUCOSE 439 04/29/2022    CALCIUM 8.8 04/29/2022          IMPRESSION:    55 y.o. male with persistent/recurrent buttock/perianal abscess.     Patient Active Problem List:     Abscess of buttock, left        PLAN:  Will do bedside I&D        Electronically signed by Darryle Lobos, MD on 4/30/2022 at 7:14 AM

## 2022-04-30 NOTE — CARE COORDINATION
Chart reviewed. Pt from home and independent. Pt has insurance, but no PCP listed. PCP list added to AVs. CM following for any needs such as IV antibiotics on DC. CM continuing to follow.

## 2022-04-30 NOTE — H&P
History and Physical      Name:  Jose Marin /Age/Sex: 1975  (55 y.o. male)   MRN & CSN:  [id number] & [id number] Admission Date/Time: 2022 10:29 PM   Location:  ED17/ED-17 PCP: No primary care provider on file. Hospital Day: 2    Assessment and Plan:   Jose Marin is a 55 y.o.  male  who presents with <principal problem not specified>    Left buttock abscess with cellulitis  Sepsis likely due to left buttocks abscess  --CT abdomen/pelvis reviewed--1.9 x 8.9 x 3.5 cm mixed fluid and gas collection noted within the subcutaneous fat of the left buttocks along the gluteal crease. Subcutaneous abscess extending up towards the perianal region with adjacent stranding seen consistent with cellulitis  --Blood cultures pending  --Continue broad-spectrum antibiotic and IV fluids  --General surgery consulted for possible I&D    New onset diabetes type 2  -- Blood glucose in the 400s on presentation  -- Hemoglobin A1c ordered  -- Start ISS, monitor blood glucose    Diet Diet NPO   DVT Prophylaxis [x] Lovenox, []  Heparin, [] SCDs, [] Ambulation   GI Prophylaxis [] PPI,  [x] H2 Blocker,  [] Carafate,  [] Diet/Tube Feeds   Code Status Full Code   Disposition Patient requires continued admission due to    MDM [] Low, [] Moderate,[x]  High  Patient's risk as above due to      History of Present Illness:     Chief Complaint: Abscess of buttock, left     Jose Marin is a 55 y.o.  male with no significant past medical history who presented to the emergency room complaining of left buttocks swelling/pain with redness which started couple of weeks ago and progressively worse. He was seen at the emergency room last Monday, had I&D done and discharged on Bactrim but had no relief. He denies any fever/chills, nausea/vomiting, chest pain, shortness of breath, headaches or dizziness. At the emergency room, he was tachycardic, WBC count 17,000, blood glucose elevated at 439.   CT abdomen/pelvis showed subcutaneous abscess and associated adjacent cellulitis seen within the left buttocks along the gluteal crease extending towards the perianal region. He received IV fluids, broad-spectrum antibiotics, general surgery was consulted and he has been admitted for further management. Ten point ROS reviewed negative, unless as noted above    Objective:   No intake or output data in the 24 hours ending 04/30/22 0238   Vitals:   Vitals:    04/29/22 2218   BP: (!) 121/91   Pulse: 150   Resp: 22   Temp: 97.5 °F (36.4 °C)   SpO2: 96%     Physical Exam:   GEN Awake male,  in no apparent distress. EYES Pupils are equally round. No scleral erythema  HENT Mucous membranes are moist. Oral pharynx without exudates  NECK Supple, no apparent masses. RESP Clear to auscultation, no wheezes, rales or rhonchi. CARDIO/VASC S1/S2 auscultated. Tachycardic without appreciable murmurs, rubs, or gallops. Peripheral pulses equal bilaterally and palpable. No peripheral edema. GI Abdomen is soft without significant tenderness, masses, or guarding. Bowel sounds are normoactive.  No costovertebral angle tenderness. HEME/LYMPH No  hepatosplenomegaly. MSK No gross joint deformities. SKIN left buttocks induration along the gluteal crease with purulent drainage and surrounding redness noted on exam  NEURO Cranial nerves appear grossly intact, no focal deficits  PSYCH Awake, alert, oriented x 4. Affect appropriate. Past Medical History:    History reviewed. No pertinent past medical history. PSHX: No past surgical history    Allergies: No Known Allergies    FAM HX: No pertinent family history.   Soc HX:   Social History     Socioeconomic History    Marital status: Single     Spouse name: None    Number of children: None    Years of education: None    Highest education level: None   Occupational History    None   Tobacco Use    Smoking status: Current Every Day Smoker     Packs/day: 0.50     Types: E-Cigarettes, Cigarettes    Smokeless tobacco: None   Vaping Use    Vaping Use: Never used   Substance and Sexual Activity    Alcohol use:  Yes    Drug use: No    Sexual activity: Yes       Medications:   Medications:    sodium chloride flush  5-40 mL IntraVENous 2 times per day    enoxaparin  30 mg SubCUTAneous BID    famotidine  20 mg Oral BID    cefepime  2,000 mg IntraVENous Q12H    insulin lispro  0-6 Units SubCUTAneous TID WC    insulin lispro  0-3 Units SubCUTAneous Nightly    vancomycin  1,500 mg IntraVENous Q12H    morphine  4 mg IntraVENous Once    ondansetron  4 mg IntraVENous Once      Infusions:     sodium chloride      sodium chloride      dextrose       PRN Meds: sodium chloride flush, 5-40 mL, PRN  sodium chloride, , PRN  ondansetron, 4 mg, Q8H PRN   Or  ondansetron, 4 mg, Q6H PRN  polyethylene glycol, 17 g, Daily PRN  acetaminophen, 650 mg, Q6H PRN   Or  acetaminophen, 650 mg, Q6H PRN  morphine, 2 mg, Q4H PRN  dextrose, 12.5 g, PRN  glucagon (rDNA), 1 mg, PRN  dextrose, 100 mL/hr, PRN  glucose, 16 g, PRN      Electronically signed by Brian Morgan MD on 4/30/2022 at 2:38 AM

## 2022-04-30 NOTE — ED PROVIDER NOTES
[address] ENCOUNTER      Pt Name: Roldan Dillon  MRN: [id number]  [place] 1975  Date of evaluation: 4/29/2022  Provider: Deonte Zuniga MD    CHIEF COMPLAINT       Chief Complaint   Patient presents with    Abscess     buttocks, seen, I&D, and started on atb on 4/26, reports worsened since         [facility]      Roldan Dillon is a 55 y.o. male who presents to the emergency department  for   Chief Complaint   Patient presents with    Abscess     buttocks, seen, I&D, and started on atb on 4/26, reports worsened since       20-year-old male presents with concern for worsening infection in his left buttock. He was seen 3 days ago in the emergency department and underwent a bedside \"drainage. \"  He was discharged home on Bactrim. States he has been taking antibiotics since then. He states that he has had some drainage from the area where he underwent procedure. He also notes that the area of affected tissue seems to be increasing. He denies any fevers or chills. No other constitutional infectious symptoms. Does endorse pain with sitting. He is not having difficulty with ambulating. Does not identify any alleviating factors. Nursing Notes, Triage Notes & Vital Signs were reviewed. REVIEW OF SYSTEMS    (2-9 systems for level 4, 10 or more for level 5)     Review of Systems   Constitutional: Negative for chills and fever. HENT: Negative for congestion, rhinorrhea and sore throat. Eyes: Negative for pain and discharge. Respiratory: Negative for cough and shortness of breath. Cardiovascular: Negative for chest pain and palpitations. Gastrointestinal: Negative for abdominal pain, nausea and vomiting. Endocrine: Negative for polydipsia and polyuria. Genitourinary: Negative for dysuria and flank pain. Musculoskeletal: Negative for back pain and neck pain. Skin: Positive for wound. Neurological: Negative for dizziness, seizures, facial asymmetry, light-headedness, numbness and headaches. Psychiatric/Behavioral: Negative for confusion. Except as noted above the remainder of the review of systems was reviewed and negative. PAST MEDICAL HISTORY   History reviewed. No pertinent past medical history. Prior to Admission medications    Medication Sig Start Date End Date Taking? Authorizing Provider   sulfamethoxazole-trimethoprim (BACTRIM DS) 800-160 MG per tablet Take 1 tablet by mouth 2 times daily for 10 days 4/26/22 5/6/22  Lisa Keith MD        Patient Active Problem List   Diagnosis    Abscess of buttock, left         SURGICAL HISTORY     History reviewed. No pertinent surgical history. CURRENT MEDICATIONS       Previous Medications    SULFAMETHOXAZOLE-TRIMETHOPRIM (BACTRIM DS) 800-160 MG PER TABLET    Take 1 tablet by mouth 2 times daily for 10 days       ALLERGIES     Patient has no known allergies. FAMILY HISTORY     History reviewed. No pertinent family history. SOCIAL HISTORY       Social History     Socioeconomic History    Marital status: Single     Spouse name: None    Number of children: None    Years of education: None    Highest education level: None   Occupational History    None   Tobacco Use    Smoking status: Current Every Day Smoker     Packs/day: 0.50     Types: E-Cigarettes, Cigarettes    Smokeless tobacco: None   Vaping Use    Vaping Use: Never used   Substance and Sexual Activity    Alcohol use:  Yes    Drug use: No    Sexual activity: Yes   Other Topics Concern    None   Social History Narrative    None     Social Determinants of Health     Financial Resource Strain:     Difficulty of Paying Living Expenses: Not on file   Food Insecurity:     Worried About Running Out of Food in the Last Year: Not on file    Kem of Food in the Last Year: Not on file   Transportation Needs:     Lack of Transportation (Medical): Not on file    Lack of Transportation (Non-Medical): Not on file   Physical Activity:     Days of Exercise per Week: Not on file    Minutes of Exercise per Session: Not on file   Stress:     Feeling of Stress : Not on file   Social Connections:     Frequency of Communication with Friends and Family: Not on file    Frequency of Social Gatherings with Friends and Family: Not on file    Attends [employer] Services: Not on file    Active Member of [address] [place] [address] or Organizations: Not on file    Attends Club or Organization Meetings: Not on file    Marital Status: Not on file   Intimate Partner Violence:     Fear of Current or Ex-Partner: Not on file    Emotionally Abused: Not on file    Physically Abused: Not on file    Sexually Abused: Not on file   Housing Stability:     Unable to Pay for Housing in the Last Year: Not on file    Number of [place] in the Last Year: Not on file    Unstable Housing in the Last Year: Not on file       SCREENINGS    Geoffrey Coma Scale  Eye Opening: Spontaneous  Best Verbal Response: Oriented  Best Motor Response: Obeys commands  [place] Coma Scale Score: 15          PHYSICAL EXAM    (up to 7 for level 4, 8 or more for level 5)     ED Triage Vitals   BP Temp Temp Source Pulse Resp SpO2 Height Weight   04/29/22 2218 04/29/22 2218 04/29/22 2218 04/29/22 2218 04/29/22 2218 04/29/22 2218 04/29/22 2217 04/29/22 2217   (!) 121/91 97.5 °F (36.4 °C) Oral 150 22 96 % 6' (1.829 m) 280 lb (127 kg)       Physical Exam  Vitals reviewed. Constitutional:       Appearance: He is not ill-appearing or toxic-appearing. HENT:      Head: Normocephalic and atraumatic. Nose: No congestion or rhinorrhea. Mouth/Throat:      Mouth: Mucous membranes are moist.      Pharynx: No oropharyngeal exudate or posterior oropharyngeal erythema. Eyes:      General:         Right eye: No discharge. Left eye: No discharge.       Extraocular Movements: Extraocular movements intact. Pupils: Pupils are equal, round, and reactive to light. Cardiovascular:      Rate and Rhythm: Normal rate. Heart sounds: No friction rub. No gallop. Pulmonary:      Effort: Pulmonary effort is normal. No respiratory distress. Chest:      Chest wall: No tenderness. Abdominal:      Palpations: Abdomen is soft. Tenderness: There is no abdominal tenderness. There is no guarding. Musculoskeletal:         General: Normal range of motion. Cervical back: Normal range of motion and neck supple. Skin:     General: Skin is warm. Capillary Refill: Capillary refill takes less than 2 seconds. Findings: Erythema present. Comments: Moderately sized area of erythema and induration on left buttock; some streaking; no fluctuance noted; no crepitance   Neurological:      General: No focal deficit present. Mental Status: He is alert. DIAGNOSTIC RESULTS     Labs Reviewed   COMPREHENSIVE METABOLIC PANEL W/ REFLEX TO MG FOR LOW K - Abnormal; Notable for the following components:       Result Value    Sodium 132 (*)     CO2 17 (*)     CREATININE 0.7 (*)     Glucose 439 (*)     AST 14 (*)     Alkaline Phosphatase 140 (*)     All other components within normal limits   CBC WITH AUTO DIFFERENTIAL - Abnormal; Notable for the following components:    WBC 16.7 (*)     Segs Relative 76.2 (*)     Lymphocytes % 13.9 (*)     Monocytes % 8.0 (*)     Immature Neutrophil % 0.8 (*)     All other components within normal limits   CULTURE, BLOOD 1   CULTURE, BLOOD 2   CULTURE, WOUND   LACTIC ACID          EKG interpreted by me. EKG shows sinus rhythm 151 bpm, axis is nondeviated, no remarkable ST segmentations or depressions, T waves are unremarkable, TN interval of 116, QRS duration of 86, QTC of 4040. Final impression, sinus tachycardia, nonspecific EKG.     RADIOLOGY:     Non-plain film images such as CT, Ultrasound and MRI are read by the radiologist. Plain radiographic images are visualized and preliminarily interpreted by the emergency physician. Interpretation per the Radiologist below, if available at the time of this note:    CT ABDOMEN PELVIS W IV CONTRAST Additional Contrast? None   Final Result   Subcutaneous abscess and associated adjacent cellulitis seen within the left   buttock along the gluteal crease, extending up towards the perianal region. Cholelithiasis without acute cholecystitis. No other acute abnormality. ED BEDSIDE ULTRASOUND:   Performed by ED Physician Nia Lafleur MD       LABS:  Labs Reviewed   COMPREHENSIVE METABOLIC PANEL W/ REFLEX TO MG FOR LOW K - Abnormal; Notable for the following components:       Result Value    Sodium 132 (*)     CO2 17 (*)     CREATININE 0.7 (*)     Glucose 439 (*)     AST 14 (*)     Alkaline Phosphatase 140 (*)     All other components within normal limits   CBC WITH AUTO DIFFERENTIAL - Abnormal; Notable for the following components:    WBC 16.7 (*)     Segs Relative 76.2 (*)     Lymphocytes % 13.9 (*)     Monocytes % 8.0 (*)     Immature Neutrophil % 0.8 (*)     All other components within normal limits   CULTURE, BLOOD 1   CULTURE, BLOOD 2   CULTURE, WOUND   LACTIC ACID       All other labs were within normal range or not returned as of this dictation. EMERGENCY DEPARTMENT COURSE and DIFFERENTIAL DIAGNOSIS/MDM:   Vitals:    Vitals:    04/29/22 2217 04/29/22 2218   BP:  (!) 121/91   Pulse:  150   Resp:  22   Temp:  97.5 °F (36.4 °C)   TempSrc:  Oral   SpO2:  96%   Weight: 280 lb (127 kg)    Height: 6' (1.829 m)            MDM  Number of Diagnoses or Management Options  Abscess  Cellulitis of buttock  Hyperglycemia  Diagnosis management comments: 77-year-old male presents for worsening cellulitis on his buttock. He was seen in the emergency department 3 days ago for a left buttock abscess that underwent a bedside \"drainage. \"  He was sent home on Bactrim.   He states he has been taking antibiotics but has had worsening symptoms. Denies any fever or chills. Presents the emergency department tachycardic. His active saturations are in the high 90s on room air. He is afebrile. Does have blood pressure mildly elevated. He was mildly tachypneic with respiratory 22. He did technically meet SIRS criteria upon presentation. Labs including blood cultures and lactic were obtained. CT scan is obtained. He is given fluids and started on broad-spectrum antibiotics. He is also treated with pain medications. He does have a high white count of 16.7. His blood sugars-239. I did talk with patient he states he is not a diagnosed diabetic. Does not endorse any remarkable symptoms like polyuria polydipsia at this time. CT scan does show a 2 cm x 9 cm x 4 cm abscess in the buttock that extends perianally. I did attempt bedside incision and drainage. Was able to evacuate some material but did not place packing. Dispo with general surgery. They will evaluate patient in the morning. He is on broad-spectrum antibiotics at this time. He will be admitted for further treatment of his cellulitis and abscess. -  Patient seen and evaluated in the emergency department. -  Triage and nursing notes reviewed and incorporated. -  Old chart records reviewed and incorporated. -  Work-up included:  See above  -  Results discussed with patient.         CONSULTS:  PHARMACY TO DOSE VANCOMYCIN  IP CONSULT TO GENERAL SURGERY  IP CONSULT TO HOSPITALIST    PROCEDURES:  None performed unless otherwise noted below     Incision/Drainage    Date/Time: 4/30/2022 2:43 AM  Performed by: Kyle Shaw MD  Authorized by: Kyle Shaw MD     Consent:     Consent obtained:  Verbal    Consent given by:  Patient    Risks discussed:  Incomplete drainage  Location:     Type:  Abscess    Size:  9cm    Location:  Anogenital    Anogenital location:  Gluteal cleft  Pre-procedure details:     Skin preparation: Chloraprep  Anesthesia (see MAR for exact dosages): Anesthesia method:  Local infiltration    Local anesthetic:  Lidocaine 1% w/o epi  Procedure type:     Complexity:  Complex  Procedure details:     Needle aspiration: no      Incision types:  Single straight    Incision depth:  Dermal    Scalpel blade:  11    Wound management:  Irrigated with saline    Drainage amount: Moderate    Wound treatment:  Wound left open    Packing materials:  None  Post-procedure details:     Patient tolerance of procedure: Tolerated well, no immediate complications            FINAL IMPRESSION      1. Cellulitis of buttock    2. Abscess    3. Hyperglycemia          DISPOSITION/PLAN   DISPOSITION        PATIENT REFERRED TO:  No follow-up provider specified. DISCHARGE MEDICATIONS:  New Prescriptions    No medications on file       ED Provider Disposition Time  DISPOSITION        Appropriate personal protective equipment was worn during the patient's evaluation. These included surgical, eye protection, surgical mask or in 95 respirator and gloves. The patient was also placed in a surgical mask for the prevention of possible spread of respiratory viral illnesses. The Patient was instructed to read the package inserts with any medication that was prescribed. Major potential reactions and medication interactions were discussed. The Patient understands that there are numerous possible adverse reactions not covered. The patient was also instructed to arrange follow-up with his or her primary care provider for review of any pending labwork or incidental findings on any radiology results that were obtained. All efforts were made to discuss any incidental findings that require further monitoring. Controlled Substances Monitoring:     No flowsheet data found.     (Please note that portions of this note were completed with a voice recognition program.  Efforts were made to edit the dictations but occasionally words are mis-transcribed.)    Marlana Bloch, MD (electronically signed)  Attending Emergency Physician           Marlana Bloch, MD  04/30/22 [id number]

## 2022-04-30 NOTE — PROCEDURES
Incision and Drainage (I&D) Procedure Note    Patient ID:  Juan R Ocampo  [id number]  82 y.o.  1975    Indications: perirectal abscess     Pre-operative Diagnosis: perirectal abscess     Post-operative Diagnosis: Same    Procedure:  I&D of perirectal abscess    Surgeon: Aurora Mike MD     Findings:  Copious purulence    Estimated Blood Loss:  22XQ          Complications:  None; patient tolerated the procedure well. Condition: stable    Procedure Details: The patient was positioned appropriately and the skin over the abscess site was prepped with betadine and draped in a sterile fashion. Local anesthesia was obtained by infiltration using 5.0 cc of 1% Lidocaine with epinephrine. An incision was then made over the greatest area of fluctuance and approximately 20 cc of foul smelling, purulent and brown material was expressed. Incision was 5 cm long 1cm wide and 3cm deep into the subcutaneous tissue. Loculations were broken up with a finger. Wound was cultured with a swab. The drainage cavity was then irrigated, packed with sterile gauze and dressed with gauze. The patient tolerated the procedure well.       Electronically signed by Aurora Mike MD on 4/30/2022 at 11:24 AM

## 2022-04-30 NOTE — PROGRESS NOTES
Carly Duarte is a 55 y.o. male started on vancomycin iv. Pharmacy consulted by ED provider:Vale Roberts MD   to order a dose of vancomycin in the emergency department. Other antimicrobials:    Ht Readings from Last 1 Encounters:   04/29/22 6' (1.829 m)     Wt Readings from Last 3 Encounters:   04/29/22 280 lb (127 kg)   04/26/22 280 lb (127 kg)   04/09/18 280 lb (127 kg)        Pertinent Laboratory Values:   Temp Readings from Last 3 Encounters:   04/29/22 97.5 °F (36.4 °C) (Oral)   04/26/22 98.2 °F (36.8 °C) (Oral)   04/09/18 98 °F (36.7 °C) (Oral)     Recent Labs     04/29/22  2300   WBC 16.7*   LACTATE 1.5     No results for input(s): BUN, CREATININE in the last 72 hours. CrCl cannot be calculated (No successful lab value found. ). No intake or output data in the 24 hours ending 04/29/22 2340    Assessment/Plan:  Pharmacy has ordered vancomycin 2250 mg (17.7 mg/kg) iv x 1  Please note, pharmacy has ordered a one-time dose of vancomycin for the Emergency Department. The consult will need to be re-ordered if vancomycin is to continue upon admission. Thank you for the consult.   Julián Kerr [facility]  4/29/2022 11:40 PM

## 2022-04-30 NOTE — PROGRESS NOTES
V2.0  [facility] Hospitalist Progress Note      Name:  Lian James /Age/Sex: 1975  (55 y.o. male)   MRN & CSN:  [id number] & [id number] Encounter Date/Time: 2022 3:39 PM EDT    [facility]  [facility] PCP: No primary care provider on file. Hospital Day: 2    Assessment and Plan:   Lian James is a 55 y.o. male with no prior medical history admitted with left buttock abscess. Found to have markedly elevated blood glucose new diagnosis of type 2 diabetes. -Left buttock abscess with cellulitis  -Sepsis likely due to left buttocks abscess  -New onset diabetes type 2    Plan:    Continue cefepime and vancomycin  ADA 18 kcal diet/diabetic education provided at bedside/before meals and at bedtime Accu-Cheks  Continue sliding scale insulin. Metformin 500 mg p.o. twice daily ordered    We will continue to monitor further management to be determined patient's overall clinical condition. Discharge planning:  Patient currently does not have a PCP. PCP to be set up prior to discharge  Disposition hopefully next 24 to 48 hours      Subjective:     Chief Complaint: Abscess (buttocks, seen, I&D, and started on atb on , reports worsened since)       Patient seen and examined at bedside, admitted earlier this morning with left buttocks abscess new diagnosis of type 2 diabetes         Review of Systems:    Review of Systems    All systems reviewed positive per HPI otherwise negative    Objective:   No intake or output data in the 24 hours ending 22 1539     Vitals:   Vitals:    22 1516   BP: (!) 117/97   Pulse: 99   Resp: 18   Temp: 98.3 °F (36.8 °C)   SpO2: 95%       Physical Exam:   General: NAD  Eyes: EOMI  ENT: neck supple  Cardiovascular: Regular rate. Respiratory: Clear to auscultation  Gastrointestinal: Soft, non tender  Genitourinary: no suprapubic tenderness  Musculoskeletal: No edema  Skin: warm, dry  Neuro: Alert. Psych: Mood appropriate.      Medications: Medications:    sodium chloride flush  5-40 mL IntraVENous 2 times per day    enoxaparin  30 mg SubCUTAneous BID    famotidine  20 mg Oral BID    cefepime  2,000 mg IntraVENous Q12H    insulin lispro  0-6 Units SubCUTAneous TID WC    insulin lispro  0-3 Units SubCUTAneous Nightly    vancomycin  1,500 mg IntraVENous Q12H    metFORMIN  500 mg Oral BID WC    morphine  4 mg IntraVENous Once    ondansetron  4 mg IntraVENous Once      Infusions:    sodium chloride      sodium chloride 150 mL/hr at 04/30/22 0516    dextrose       PRN Meds: sodium chloride flush, 5-40 mL, PRN  sodium chloride, , PRN  ondansetron, 4 mg, Q8H PRN   Or  ondansetron, 4 mg, Q6H PRN  polyethylene glycol, 17 g, Daily PRN  acetaminophen, 650 mg, Q6H PRN   Or  acetaminophen, 650 mg, Q6H PRN  morphine, 2 mg, Q4H PRN  dextrose, 12.5 g, PRN  glucagon (rDNA), 1 mg, PRN  dextrose, 100 mL/hr, PRN  glucose, 16 g, PRN        Labs      Recent Results (from the past 24 hour(s))   EKG 12 Lead    Collection Time: 04/29/22 10:24 PM   Result Value Ref Range    Ventricular Rate 151 BPM    Atrial Rate 151 BPM    P-R Interval 116 ms    QRS Duration 86 ms    Q-T Interval 278 ms    QTc Calculation (Bazett) 440 ms    P Axis 21 degrees    R Axis 64 degrees    T Axis 7 degrees    Diagnosis       Sinus tachycardia  Otherwise normal ECG  No previous ECGs available     Lactic Acid    Collection Time: 04/29/22 11:00 PM   Result Value Ref Range    Lactate 1.5 0.4 - 2.0 mMOL/L   Comprehensive Metabolic Panel w/ Reflex to MG    Collection Time: 04/29/22 11:00 PM   Result Value Ref Range    Sodium 132 (L) 135 - 145 MMOL/L    Potassium 4.0 3.5 - 5.1 MMOL/L    Chloride 99 99 - 110 mMol/L    CO2 17 (L) 21 - 32 MMOL/L    BUN 14 6 - 23 MG/DL    CREATININE 0.7 (L) 0.9 - 1.3 MG/DL    Glucose 439 (HH) 70 - 99 MG/DL    Calcium 8.8 8.3 - 10.6 MG/DL    Albumin 3.6 3.4 - 5.0 GM/DL    Total Protein 7.2 6.4 - 8.2 GM/DL    Total Bilirubin 0.5 0.0 - 1.0 MG/DL    ALT 21 10 - 40 U/L AST 14 (L) 15 - 37 IU/L    Alkaline Phosphatase 140 (H) 40 - 129 IU/L    GFR Non-African American >60 >60 mL/min/1.73m2    GFR African American >60 >60 mL/min/1.73m2    Anion Gap 16 4 - 16   CBC with Auto Differential    Collection Time: 04/29/22 11:00 PM   Result Value Ref Range    WBC 16.7 (H) 4.0 - 10.5 K/CU MM    RBC 5.28 4.6 - 6.2 M/CU MM    Hemoglobin 15.6 13.5 - 18.0 GM/DL    Hematocrit 46.8 42 - 52 %    MCV 88.6 78 - 100 FL    MCH 29.5 27 - 31 PG    MCHC 33.3 32.0 - 36.0 %    RDW 11.8 11.7 - 14.9 %    Platelets 419 318 - 228 K/CU MM    MPV 10.5 7.5 - 11.1 FL    Differential Type AUTOMATED DIFFERENTIAL     Segs Relative 76.2 (H) 36 - 66 %    Lymphocytes % 13.9 (L) 24 - 44 %    Monocytes % 8.0 (H) 0 - 4 %    Eosinophils % 0.5 0 - 3 %    Basophils % 0.6 0 - 1 %    Segs Absolute 12.7 K/CU MM    Lymphocytes Absolute 2.3 K/CU MM    Monocytes Absolute 1.3 K/CU MM    Eosinophils Absolute 0.1 K/CU MM    Basophils Absolute 0.1 K/CU MM    Nucleated RBC % 0.0 %    Total Nucleated RBC 0.0 K/CU MM    Total Immature Neutrophil 0.14 K/CU MM    Immature Neutrophil % 0.8 (H) 0 - 0.43 %   POCT Glucose    Collection Time: 04/30/22  8:42 AM   Result Value Ref Range    POC Glucose 251 (H) 70 - 99 MG/DL   POCT Glucose    Collection Time: 04/30/22 10:44 AM   Result Value Ref Range    POC Glucose 255 (H) 70 - 99 MG/DL        Imaging/Diagnostics Last 24 Hours   CT ABDOMEN PELVIS W IV CONTRAST Additional Contrast? None    Result Date: 4/30/2022  EXAMINATION: CT OF THE ABDOMEN AND PELVIS WITH CONTRAST 4/30/2022 12:44 am TECHNIQUE: CT of the abdomen and pelvis was performed with the administration of intravenous contrast. Multiplanar reformatted images are provided for review. Dose modulation, iterative reconstruction, and/or weight based adjustment of the mA/kV was utilized to reduce the radiation dose to as low as reasonably achievable. COMPARISON: None.  HISTORY: ORDERING SYSTEM PROVIDED HISTORY: worsening left buttock abscess/cellulitis TECHNOLOGIST PROVIDED HISTORY: Reason for exam:->worsening left buttock abscess/cellulitis Additional Contrast?->None Decision Support Exception - unselect if not a suspected or confirmed emergency medical condition->Emergency Medical Condition (MA) Reason for Exam: worsening left buttock abscess/cellulitis FINDINGS: Lower Chest: No parenchymal infiltrate or pleural effusion. No pericardial effusion. No distal esophageal thickening is identified. Organs: No enhancing mass in the liver or spleen. Cholelithiasis. No adrenal mass. No pancreatic mass. No peripancreatic inflammatory process. No enhancing renal mass. No urinary tract calculi or obstruction is identified. No periureteral stranding. GI/Bowel: No ileus or obstruction is identified. The appendix appears normal.  Diverticulosis without acute diverticulitis. Pelvis: No pelvic mass. The bladder appears unremarkable. No free fluid is identified in the pelvis. No bulky pelvic lymphadenopathy. Peritoneum/Retroperitoneum: No aortic aneurysm. No retroperitoneal or mesenteric lymphadenopathy. Bones/Soft Tissues: Within the subcutaneous fat of the left buttocks along the gluteal crease, axial image 240, sagittal image 78, there is a 1.9 x 8.9 x 3.5 cm mixed fluid and gas collection felt to represent a subcutaneous abscess, with measurements in transverse, craniocaudal, and AP dimensions respectively. Adjacent stranding is seen consistent with cellulitis. The abscess collection extends cephalad into the perianal region on and around axial image 216. Subcutaneous abscess and associated adjacent cellulitis seen within the left buttock along the gluteal crease, extending up towards the perianal region. Cholelithiasis without acute cholecystitis. No other acute abnormality.        Electronically signed by Jairo Boyer DO on 4/30/2022 at 3:39 PM

## 2022-04-30 NOTE — PROGRESS NOTES
[place] [address]  Pharmacy consulted by Miladys Marcus MD  for monitoring and adjustment. Indication for treatment: skin & soft tissue; abcess buttocks  Goal trough: [x] 10-15 mcg/mL or [] 15-20 mcg/ml  AUC/SHAYY: [] <500 or [x] 400-600    Pertinent Laboratory Values:   Temp Readings from Last 3 Encounters:   04/29/22 97.5 °F (36.4 °C) (Oral)   04/26/22 98.2 °F (36.8 °C) (Oral)   04/09/18 98 °F (36.7 °C) (Oral)     Recent Labs     04/29/22  2300   WBC 16.7*   LACTATE 1.5     Recent Labs     04/29/22  2300   BUN 14   CREATININE 0.7*     Estimated Creatinine Clearance: 182 mL/min (A) (based on SCr of 0.7 mg/dL (L)). No intake or output data in the 24 hours ending 04/30/22 0320    Pertinent Cultures:  Date    Source    Results  4/29                          blood x 2                                    pending    Vancomycin level:   TROUGH:  No results for input(s): VANCOTROUGH in the last 72 hours. RANDOM:  No results for input(s): VANCORANDOM in the last 72 hours. Assessment:  SCr, BUN, and urine output: Renal at baseline, scr= 0.7, crcl > 100. I/O = pending  Day(s) of therapy: 1  Vancomycin concentration: pending    Plan:  55 yom, 127kg obese. Renal at baseline, scr= 0.7, crcl > 100. Note: a 1x Loading dose of vancomycin 2250 mg iv was just given - about 0130. Then continue on with Vancomycin as 1500mg [11.8 mg/kg/dose] iv q12hr  Pharmacy will continue to monitor patient and adjust therapy as indicated    [address]    Thank you for the consult.   [place], [facility]  4/30/2022 3:20 AM

## 2022-05-01 LAB
ANION GAP SERPL CALCULATED.3IONS-SCNC: 15 MMOL/L (ref 4–16)
BASOPHILS ABSOLUTE: 0.1 K/CU MM
BASOPHILS RELATIVE PERCENT: 1 % (ref 0–1)
BUN BLDV-MCNC: 11 MG/DL (ref 6–23)
CALCIUM SERPL-MCNC: 8 MG/DL (ref 8.3–10.6)
CHLORIDE BLD-SCNC: 100 MMOL/L (ref 99–110)
CO2: 18 MMOL/L (ref 21–32)
CREAT SERPL-MCNC: 0.5 MG/DL (ref 0.9–1.3)
DIFFERENTIAL TYPE: ABNORMAL
DOSE AMOUNT: NORMAL
DOSE TIME: NORMAL
EKG ATRIAL RATE: 151 BPM
EKG DIAGNOSIS: NORMAL
EKG P AXIS: 21 DEGREES
EKG P-R INTERVAL: 116 MS
EKG Q-T INTERVAL: 278 MS
EKG QRS DURATION: 86 MS
EKG QTC CALCULATION (BAZETT): 440 MS
EKG R AXIS: 64 DEGREES
EKG T AXIS: 7 DEGREES
EKG VENTRICULAR RATE: 151 BPM
EOSINOPHILS ABSOLUTE: 0.1 K/CU MM
EOSINOPHILS RELATIVE PERCENT: 1.2 % (ref 0–3)
ESTIMATED AVERAGE GLUCOSE: 298 MG/DL
GFR AFRICAN AMERICAN: >60 ML/MIN/1.73M2
GFR NON-AFRICAN AMERICAN: >60 ML/MIN/1.73M2
GLUCOSE BLD-MCNC: 158 MG/DL (ref 70–99)
GLUCOSE BLD-MCNC: 210 MG/DL (ref 70–99)
GLUCOSE BLD-MCNC: 212 MG/DL (ref 70–99)
GLUCOSE BLD-MCNC: 231 MG/DL (ref 70–99)
GLUCOSE BLD-MCNC: 302 MG/DL (ref 70–99)
GLUCOSE BLD-MCNC: 308 MG/DL (ref 70–99)
HBA1C MFR BLD: 12 % (ref 4.2–6.3)
HCT VFR BLD CALC: 41.6 % (ref 42–52)
HEMOGLOBIN: 13.9 GM/DL (ref 13.5–18)
IMMATURE NEUTROPHIL %: 2.3 % (ref 0–0.43)
LYMPHOCYTES ABSOLUTE: 2.1 K/CU MM
LYMPHOCYTES RELATIVE PERCENT: 18.9 % (ref 24–44)
MCH RBC QN AUTO: 29.6 PG (ref 27–31)
MCHC RBC AUTO-ENTMCNC: 33.4 % (ref 32–36)
MCV RBC AUTO: 88.7 FL (ref 78–100)
MONOCYTES ABSOLUTE: 0.7 K/CU MM
MONOCYTES RELATIVE PERCENT: 5.9 % (ref 0–4)
NUCLEATED RBC %: 0 %
PDW BLD-RTO: 12 % (ref 11.7–14.9)
PLATELET # BLD: 333 K/CU MM (ref 140–440)
PMV BLD AUTO: 10.5 FL (ref 7.5–11.1)
POTASSIUM SERPL-SCNC: 4.3 MMOL/L (ref 3.5–5.1)
RBC # BLD: 4.69 M/CU MM (ref 4.6–6.2)
SEGMENTED NEUTROPHILS ABSOLUTE COUNT: 7.7 K/CU MM
SEGMENTED NEUTROPHILS RELATIVE PERCENT: 70.7 % (ref 36–66)
SODIUM BLD-SCNC: 133 MMOL/L (ref 135–145)
TOTAL IMMATURE NEUTOROPHIL: 0.25 K/CU MM
TOTAL NUCLEATED RBC: 0 K/CU MM
VANCOMYCIN RANDOM: 8.9 UG/ML
WBC # BLD: 10.9 K/CU MM (ref 4–10.5)

## 2022-05-01 PROCEDURE — 80202 ASSAY OF VANCOMYCIN: CPT

## 2022-05-01 PROCEDURE — 93010 ELECTROCARDIOGRAM REPORT: CPT | Performed by: INTERNAL MEDICINE

## 2022-05-01 PROCEDURE — 6360000002 HC RX W HCPCS: Performed by: HOSPITALIST

## 2022-05-01 PROCEDURE — 36415 COLL VENOUS BLD VENIPUNCTURE: CPT

## 2022-05-01 PROCEDURE — 6370000000 HC RX 637 (ALT 250 FOR IP): Performed by: INTERNAL MEDICINE

## 2022-05-01 PROCEDURE — 99024 POSTOP FOLLOW-UP VISIT: CPT | Performed by: SURGERY

## 2022-05-01 PROCEDURE — 83036 HEMOGLOBIN GLYCOSYLATED A1C: CPT

## 2022-05-01 PROCEDURE — 2580000003 HC RX 258: Performed by: HOSPITALIST

## 2022-05-01 PROCEDURE — 6370000000 HC RX 637 (ALT 250 FOR IP): Performed by: HOSPITALIST

## 2022-05-01 PROCEDURE — 85025 COMPLETE CBC W/AUTO DIFF WBC: CPT

## 2022-05-01 PROCEDURE — 94761 N-INVAS EAR/PLS OXIMETRY MLT: CPT

## 2022-05-01 PROCEDURE — 80048 BASIC METABOLIC PNL TOTAL CA: CPT

## 2022-05-01 PROCEDURE — 1200000000 HC SEMI PRIVATE

## 2022-05-01 PROCEDURE — 82962 GLUCOSE BLOOD TEST: CPT

## 2022-05-01 RX ORDER — INSULIN GLARGINE 100 [IU]/ML
0.25 INJECTION, SOLUTION SUBCUTANEOUS NIGHTLY
Status: DISCONTINUED | OUTPATIENT
Start: 2022-05-01 | End: 2022-05-01

## 2022-05-01 RX ORDER — DEXTROSE MONOHYDRATE 25 G/50ML
12.5 INJECTION, SOLUTION INTRAVENOUS PRN
Status: DISCONTINUED | OUTPATIENT
Start: 2022-05-01 | End: 2022-05-02 | Stop reason: HOSPADM

## 2022-05-01 RX ORDER — INSULIN LISPRO 100 [IU]/ML
0-6 INJECTION, SOLUTION INTRAVENOUS; SUBCUTANEOUS
Status: DISCONTINUED | OUTPATIENT
Start: 2022-05-01 | End: 2022-05-02 | Stop reason: HOSPADM

## 2022-05-01 RX ORDER — LISINOPRIL 5 MG/1
5 TABLET ORAL DAILY
Status: DISCONTINUED | OUTPATIENT
Start: 2022-05-01 | End: 2022-05-02 | Stop reason: HOSPADM

## 2022-05-01 RX ORDER — INSULIN LISPRO 100 [IU]/ML
10 INJECTION, SOLUTION INTRAVENOUS; SUBCUTANEOUS
Status: DISCONTINUED | OUTPATIENT
Start: 2022-05-01 | End: 2022-05-02

## 2022-05-01 RX ORDER — INSULIN LISPRO 100 [IU]/ML
0.08 INJECTION, SOLUTION INTRAVENOUS; SUBCUTANEOUS
Status: DISCONTINUED | OUTPATIENT
Start: 2022-05-01 | End: 2022-05-01

## 2022-05-01 RX ORDER — INSULIN GLARGINE 100 [IU]/ML
35 INJECTION, SOLUTION SUBCUTANEOUS NIGHTLY
Status: DISCONTINUED | OUTPATIENT
Start: 2022-05-01 | End: 2022-05-02 | Stop reason: HOSPADM

## 2022-05-01 RX ORDER — DEXTROSE MONOHYDRATE 50 MG/ML
100 INJECTION, SOLUTION INTRAVENOUS PRN
Status: DISCONTINUED | OUTPATIENT
Start: 2022-05-01 | End: 2022-05-02 | Stop reason: HOSPADM

## 2022-05-01 RX ORDER — INSULIN LISPRO 100 [IU]/ML
0-12 INJECTION, SOLUTION INTRAVENOUS; SUBCUTANEOUS
Status: DISCONTINUED | OUTPATIENT
Start: 2022-05-01 | End: 2022-05-02 | Stop reason: HOSPADM

## 2022-05-01 RX ADMIN — ENOXAPARIN SODIUM 30 MG: 100 INJECTION SUBCUTANEOUS at 20:55

## 2022-05-01 RX ADMIN — SODIUM CHLORIDE: 9 INJECTION, SOLUTION INTRAVENOUS at 13:39

## 2022-05-01 RX ADMIN — FAMOTIDINE 20 MG: 20 TABLET ORAL at 08:03

## 2022-05-01 RX ADMIN — LISINOPRIL 5 MG: 5 TABLET ORAL at 12:05

## 2022-05-01 RX ADMIN — INSULIN LISPRO 1 UNITS: 100 INJECTION, SOLUTION INTRAVENOUS; SUBCUTANEOUS at 20:59

## 2022-05-01 RX ADMIN — INSULIN LISPRO 10 UNITS: 100 INJECTION, SOLUTION INTRAVENOUS; SUBCUTANEOUS at 17:32

## 2022-05-01 RX ADMIN — SODIUM CHLORIDE: 9 INJECTION, SOLUTION INTRAVENOUS at 06:49

## 2022-05-01 RX ADMIN — CEFEPIME HYDROCHLORIDE 2000 MG: 2 INJECTION, POWDER, FOR SOLUTION INTRAVENOUS at 12:01

## 2022-05-01 RX ADMIN — SODIUM CHLORIDE: 9 INJECTION, SOLUTION INTRAVENOUS at 23:49

## 2022-05-01 RX ADMIN — VANCOMYCIN HYDROCHLORIDE 1500 MG: 5 INJECTION, POWDER, LYOPHILIZED, FOR SOLUTION INTRAVENOUS at 03:29

## 2022-05-01 RX ADMIN — CEFEPIME HYDROCHLORIDE 2000 MG: 2 INJECTION, POWDER, FOR SOLUTION INTRAVENOUS at 23:50

## 2022-05-01 RX ADMIN — ENOXAPARIN SODIUM 30 MG: 100 INJECTION SUBCUTANEOUS at 08:04

## 2022-05-01 RX ADMIN — ACETAMINOPHEN 650 MG: 325 TABLET ORAL at 08:04

## 2022-05-01 RX ADMIN — VANCOMYCIN HYDROCHLORIDE 1500 MG: 5 INJECTION, POWDER, LYOPHILIZED, FOR SOLUTION INTRAVENOUS at 15:25

## 2022-05-01 RX ADMIN — INSULIN LISPRO 4 UNITS: 100 INJECTION, SOLUTION INTRAVENOUS; SUBCUTANEOUS at 17:32

## 2022-05-01 RX ADMIN — INSULIN LISPRO 4 UNITS: 100 INJECTION, SOLUTION INTRAVENOUS; SUBCUTANEOUS at 08:05

## 2022-05-01 RX ADMIN — METFORMIN HYDROCHLORIDE 500 MG: 500 TABLET ORAL at 08:03

## 2022-05-01 RX ADMIN — INSULIN LISPRO 10 UNITS: 100 INJECTION, SOLUTION INTRAVENOUS; SUBCUTANEOUS at 12:02

## 2022-05-01 RX ADMIN — INSULIN LISPRO 4 UNITS: 100 INJECTION, SOLUTION INTRAVENOUS; SUBCUTANEOUS at 12:03

## 2022-05-01 RX ADMIN — FAMOTIDINE 20 MG: 20 TABLET ORAL at 20:55

## 2022-05-01 ASSESSMENT — PAIN DESCRIPTION - FREQUENCY: FREQUENCY: INTERMITTENT

## 2022-05-01 ASSESSMENT — PAIN SCALES - GENERAL: PAINLEVEL_OUTOF10: 2

## 2022-05-01 ASSESSMENT — PAIN DESCRIPTION - ORIENTATION: ORIENTATION: LEFT

## 2022-05-01 ASSESSMENT — PAIN DESCRIPTION - DESCRIPTORS: DESCRIPTORS: DISCOMFORT

## 2022-05-01 ASSESSMENT — PAIN DESCRIPTION - PAIN TYPE: TYPE: ACUTE PAIN

## 2022-05-01 ASSESSMENT — PAIN DESCRIPTION - ONSET: ONSET: ON-GOING

## 2022-05-01 ASSESSMENT — PAIN DESCRIPTION - LOCATION: LOCATION: BUTTOCKS

## 2022-05-01 NOTE — PROGRESS NOTES
GENERAL SURGERY PROGRESS NOTE    Tati Lombardi is a 55 y.o. male s/p I&D of perirectal abscess. Subjective:  Doing ok today. Pain improving. Denies fevers or chills. Objective:    Vitals: VITALS:  BP (!) 148/81   Pulse 99   Temp 98.2 °F (36.8 °C) (Oral)   Resp 17   Ht 6' (1.829 m)   Wt 293 lb 3.4 oz (133 kg)   SpO2 97%   BMI 39.77 kg/m²     I/O: 04/30 0701 - 05/01 0700  In: 1900 [I.V.:1900]  Out: -     Labs/Imaging Results:   Lab Results   Component Value Date     04/29/2022    K 4.0 04/29/2022    CL 99 04/29/2022    CO2 17 04/29/2022    BUN 14 04/29/2022    CREATININE 0.7 04/29/2022    GLUCOSE 439 04/29/2022    CALCIUM 8.8 04/29/2022      Lab Results   Component Value Date    WBC 16.7 (H) 04/29/2022    HGB 15.6 04/29/2022    HCT 46.8 04/29/2022    MCV 88.6 04/29/2022     04/29/2022       IV Fluids: sodium chloride    sodium chloride Last Rate: 150 mL/hr at 05/01/22 0649    dextrose    Scheduled Meds:   sodium chloride flush, 5-40 mL, IntraVENous, 2 times per day    enoxaparin, 30 mg, SubCUTAneous, BID    famotidine, 20 mg, Oral, BID    cefepime, 2,000 mg, IntraVENous, Q12H    insulin lispro, 0-6 Units, SubCUTAneous, TID WC    insulin lispro, 0-3 Units, SubCUTAneous, Nightly    vancomycin, 1,500 mg, IntraVENous, Q12H    metFORMIN, 500 mg, Oral, BID WC    morphine, 4 mg, IntraVENous, Once    ondansetron, 4 mg, IntraVENous, Once    Physical Exam:  General: A&O x 3, no distress. HEENT: Anicteric sclerae, MMM. Extremities: No edema bilat LE. Buttock: dressing changed today, mild soupy drainage, decreasing induration      Assessment and Plan:  55 y.o. male s/p I&D of perirectal abscess. Improving.     Patient Active Problem List:     Abscess of buttock, left      - ok for discharge from a surgical perspective with daily dressing changes to buttock wound and 1 week of PO antibiotics  - may need home health for assistance with dressings at home  - follow up with me in clinic in 1 week    Marycarmen Carrillo MD

## 2022-05-01 NOTE — PROGRESS NOTES
Endocrinology   Consult Note  4/29/2022 10:29 PM     Primary Care provider: No primary care provider on file. Referring physician:  Cherri Curry MD     Dear Doctor Nikita Matos    Thank You for the Consult     Pt. Was Admitted for : Perirectal abscess left side    Reason for Consult: Better control of blood glucose      History Obtained From:  Patient/ EMR       HISTORY OF PRESENT ILLNESS:                The patient is a 55 y.o. male with significant past medical history of none came with the complaints of perirectal abscess mostly left side. He was seen in the ER about a week ago and treated with limited I&D and oral antibiotic. But it did not help and actually got worse so he came back again couple days ago and this time was admitted. He he had extensive incision drainage done on 4/30/2022. Patient is not known diabetic but this time noted to have high blood glucose levels. I was  consulted for better control of blood glucose. ROS:   Pt's ROS done in detail. Abnormal ROS are noted in Medical and Surgical History Section below: Other Medical History:    History reviewed. No pertinent past medical history. Surgical History:    History reviewed. No pertinent surgical history. Allergies:  Patient has no known allergies. Family History:   History reviewed. No pertinent family history. REVIEW OF SYSTEMS:  Review of System Done as noted above     PHYSICAL EXAM:      Vitals:    BP (!) 148/81   Pulse 99   Temp 98.2 °F (36.8 °C) (Oral)   Resp 17   Ht 6' (1.829 m)   Wt 293 lb 3.4 oz (133 kg)   SpO2 97%   BMI 39.77 kg/m²     CONSTITUTIONAL:  awake, alert, cooperative, appears stated age   EYES:  vision intact Fundoscopic Exam not performed   ENT:Normal  NECK:  Supple, No JVD.    Thyroid Exam:Normal   LUNGS:  Has Vesicular Breath Sounds,   CARDIOVASCULAR:  Normal apical impulse, regular rate and rhythm, normal S1 and S2, no S3 or S4, and has no  murmur   ABDOMEN:  No scars, normal bowel sounds, soft, non-distended, non-tender, no masses palpated, no hepatolienomegaly  Has perirectal abscess drained in depth and now has a drain and has been covered with dressing  Musculoskeletal: Normal  Extremities: Normal, peripheral pulses normal, , has no edema   NEUROLOGIC:  Awake, alert, oriented to name, place and time. Cranial nerves II-XII are grossly intact. Motor is  intact. Sensory is intact. ,  and gait is normal.    DATA:    CBC: Recent Labs     04/29/22  2300   WBC 16.7*   HGB 15.6       CMP:  Recent Labs     04/29/22  2300   *   K 4.0   CL 99   CO2 17*   BUN 14   CREATININE 0.7*   CALCIUM 8.8   PROT 7.2   LABALBU 3.6   BILITOT 0.5   ALKPHOS 140*   AST 14*   ALT 21     Lipids: No results found for: CHOL, HDL, TRIG  Glucose:   Recent Labs     05/01/22  0211 05/01/22  0641 05/01/22  1110   POCGLU 212* 308* 210*     Hemoglobin A1C: No results found for: LABA1C  Free T4: No results found for: T4FREE  Free T3: No results found for: FT3  TSH High Sensitivity: No results found for: TSHHS    CT ABDOMEN PELVIS W IV CONTRAST Additional Contrast? None   Final Result   Subcutaneous abscess and associated adjacent cellulitis seen within the left   buttock along the gluteal crease, extending up towards the perianal region. Cholelithiasis without acute cholecystitis. No other acute abnormality.                 Scheduled Medicines   Medications:    lisinopril  5 mg Oral Daily    insulin lispro  0-12 Units SubCUTAneous TID WC    insulin lispro  0-6 Units SubCUTAneous 2 times per day    insulin glargine  35 Units SubCUTAneous Nightly    insulin lispro  10 Units SubCUTAneous TID     sodium chloride flush  5-40 mL IntraVENous 2 times per day    enoxaparin  30 mg SubCUTAneous BID    famotidine  20 mg Oral BID    cefepime  2,000 mg IntraVENous Q12H    vancomycin  1,500 mg IntraVENous Q12H    [Held by provider] metFORMIN  500 mg Oral BID WC    morphine  4 mg IntraVENous Once    ondansetron  4 mg IntraVENous Once      Infusions:    dextrose      sodium chloride      sodium chloride 150 mL/hr at 05/01/22 3523         IMPRESSION    Patient Active Problem List   Diagnosis    Abscess of buttock, left        new onset diabetes mellitus      RECOMMENDATIONS:      1. Reviewed POC blood glucose . Labs and X ray results   2. Reviewed Home and Current Medicines   3. Will Start On meal/ Correction bolus Humalog/ Lantus Insulin regime   4. Monitor Blood glucose frequently   5. Modify  the dose of Insulin  as needed        Will follow with you  Again thank you for sharing pt's care with me.      Truly yours,       Jigna Adam MD

## 2022-05-01 NOTE — PROGRESS NOTES
V2.0  [facility] [place]      Name:  Alisa Carpenter /Age/Sex: 1975  (55 y.o. male)   MRN & CSN:  [id number] & [id number] Encounter Date/Time: 2022 7:25 PM EDT    [facility]  [facility] PCP: No primary care provider on file. Hospital Day: 3    Assessment and Plan:       Alisa Carpenter is a 55 y.o. male with no prior medical history admitted with left buttock abscess. Found to have markedly elevated blood glucose new diagnosis of type 2 diabetes.     -Left buttock abscess with cellulitis  -Sepsis likely due to left buttocks abscess  -New onset diabetes type 2     Plan:     Continue cefepime and vancomycin  ADA 18 kcal diet/diabetic education provided at bedside/before meals and at bedtime Accu-Cheks  Continue sliding scale insulin/bolus long-acting insulin Metformin 500 mg p.o. twice daily ordered. Endocrinology consulted today     We will continue to monitor further management to be determined patient's overall clinical condition.     Discharge planning:  Patient currently does not have a PCP. PCP to be set up prior to discharge  Disposition hopefully next 24      Subjective:      Chief Complaint: Abscess (buttocks, seen, I&D, and started on atb on , reports worsened since)        Patient seen and examined at bedside, admitted earlier this morning with left buttocks abscess new diagnosis of type 2 diabetes, blood glucose remains uncontrolled, patient stated pain in buttock area has improved afebrile overnight.   Endocrinologist consulted for continued area of care postdischarge.           Review of Systems:    Review of Systems     All systems reviewed positive per HPI otherwise negative     Objective:   No intake or output data in the 24 hours ending 22 1539     Vitals:   Vitals:    22 1526   BP: 135/76   Pulse: 94   Resp: 16   Temp: 98.6 °F (37 °C)   SpO2: 96%       Physical Exam:   General: NAD  Eyes: EOMI  ENT: neck supple  Cardiovascular: Regular rate.  Respiratory: Clear to auscultation  Gastrointestinal: Soft, non tender  Genitourinary: no suprapubic tenderness  Musculoskeletal: No edema  Skin: warm, dry  Neuro: Alert.   Psych: Mood appropriate.          Medications:   Medications:    lisinopril  5 mg Oral Daily    insulin lispro  0-12 Units SubCUTAneous TID WC    insulin lispro  0-6 Units SubCUTAneous 2 times per day    insulin glargine  35 Units SubCUTAneous Nightly    insulin lispro  10 Units SubCUTAneous TID WC    sodium chloride flush  5-40 mL IntraVENous 2 times per day    enoxaparin  30 mg SubCUTAneous BID    famotidine  20 mg Oral BID    cefepime  2,000 mg IntraVENous Q12H    vancomycin  1,500 mg IntraVENous Q12H    [Held by provider] metFORMIN  500 mg Oral BID WC    morphine  4 mg IntraVENous Once    ondansetron  4 mg IntraVENous Once      Infusions:    dextrose      sodium chloride      sodium chloride 150 mL/hr at 05/01/22 1339     PRN Meds: glucose, 16 g, PRN  dextrose, 12.5 g, PRN  glucagon (rDNA), 1 mg, PRN  dextrose, 100 mL/hr, PRN  sodium chloride flush, 5-40 mL, PRN  sodium chloride, , PRN  ondansetron, 4 mg, Q8H PRN   Or  ondansetron, 4 mg, Q6H PRN  polyethylene glycol, 17 g, Daily PRN  acetaminophen, 650 mg, Q6H PRN   Or  acetaminophen, 650 mg, Q6H PRN  morphine, 2 mg, Q4H PRN  dextrose bolus (hypoglycemia), 250 mL, PRN        Labs      Recent Results (from the past 24 hour(s))   POCT Glucose    Collection Time: 04/30/22  8:36 PM   Result Value Ref Range    POC Glucose 245 (H) 70 - 99 MG/DL   POCT Glucose    Collection Time: 05/01/22  2:11 AM   Result Value Ref Range    POC Glucose 212 (H) 70 - 99 MG/DL   POCT Glucose    Collection Time: 05/01/22  6:41 AM   Result Value Ref Range    POC Glucose 308 (H) 70 - 99 MG/DL   POCT Glucose    Collection Time: 05/01/22 11:10 AM   Result Value Ref Range    POC Glucose 210 (H) 70 - 99 MG/DL   Hemoglobin A1c    Collection Time: 05/01/22 12:19 PM   Result Value Ref Range    Hemoglobin A1C 12.0 (H) 4.2 - 6.3 %    eAG 298 mg/dL   Basic Metabolic Panel w/ Reflex to MG    Collection Time: 05/01/22 12:23 PM   Result Value Ref Range    Sodium 133 (L) 135 - 145 MMOL/L    Potassium 4.3 3.5 - 5.1 MMOL/L    Chloride 100 99 - 110 mMol/L    CO2 18 (L) 21 - 32 MMOL/L    Anion Gap 15 4 - 16    BUN 11 6 - 23 MG/DL    CREATININE 0.5 (L) 0.9 - 1.3 MG/DL    Glucose 302 (H) 70 - 99 MG/DL    Calcium 8.0 (L) 8.3 - 10.6 MG/DL    GFR Non-African American >60 >60 mL/min/1.73m2    GFR African American >60 >60 mL/min/1.73m2   CBC with Auto Differential    Collection Time: 05/01/22 12:23 PM   Result Value Ref Range    WBC 10.9 (H) 4.0 - 10.5 K/CU MM    RBC 4.69 4.6 - 6.2 M/CU MM    Hemoglobin 13.9 13.5 - 18.0 GM/DL    Hematocrit 41.6 (L) 42 - 52 %    MCV 88.7 78 - 100 FL    MCH 29.6 27 - 31 PG    MCHC 33.4 32.0 - 36.0 %    RDW 12.0 11.7 - 14.9 %    Platelets 672 024 - 555 K/CU MM    MPV 10.5 7.5 - 11.1 FL    Differential Type AUTOMATED DIFFERENTIAL     Segs Relative 70.7 (H) 36 - 66 %    Lymphocytes % 18.9 (L) 24 - 44 %    Monocytes % 5.9 (H) 0 - 4 %    Eosinophils % 1.2 0 - 3 %    Basophils % 1.0 0 - 1 %    Segs Absolute 7.7 K/CU MM    Lymphocytes Absolute 2.1 K/CU MM    Monocytes Absolute 0.7 K/CU MM    Eosinophils Absolute 0.1 K/CU MM    Basophils Absolute 0.1 K/CU MM    Nucleated RBC % 0.0 %    Total Nucleated RBC 0.0 K/CU MM    Total Immature Neutrophil 0.25 K/CU MM    Immature Neutrophil % 2.3 (H) 0 - 0.43 %   Vancomycin Level, Random    Collection Time: 05/01/22 12:23 PM   Result Value Ref Range    Vancomycin Rm 8.9 UG/ML    DOSE AMOUNT DOSE AMT.  GIVEN - 1500mg     DOSE TIME DOSE TIME GIVEN - iv q12hr    POCT Glucose    Collection Time: 05/01/22  4:27 PM   Result Value Ref Range    POC Glucose 231 (H) 70 - 99 MG/DL        Imaging/Diagnostics Last 24 Hours   CT ABDOMEN PELVIS W IV CONTRAST Additional Contrast? None    Result Date: 4/30/2022  EXAMINATION: CT OF THE ABDOMEN AND PELVIS WITH CONTRAST 4/30/2022 12:44 am TECHNIQUE: CT of the abdomen and pelvis was performed with the administration of intravenous contrast. Multiplanar reformatted images are provided for review. Dose modulation, iterative reconstruction, and/or weight based adjustment of the mA/kV was utilized to reduce the radiation dose to as low as reasonably achievable. COMPARISON: None. HISTORY: ORDERING SYSTEM PROVIDED HISTORY: worsening left buttock abscess/cellulitis TECHNOLOGIST PROVIDED HISTORY: Reason for exam:->worsening left buttock abscess/cellulitis Additional Contrast?->None Decision Support Exception - unselect if not a suspected or confirmed emergency medical condition->Emergency Medical Condition (MA) Reason for Exam: worsening left buttock abscess/cellulitis FINDINGS: Lower Chest: No parenchymal infiltrate or pleural effusion. No pericardial effusion. No distal esophageal thickening is identified. Organs: No enhancing mass in the liver or spleen. Cholelithiasis. No adrenal mass. No pancreatic mass. No peripancreatic inflammatory process. No enhancing renal mass. No urinary tract calculi or obstruction is identified. No periureteral stranding. GI/Bowel: No ileus or obstruction is identified. The appendix appears normal.  Diverticulosis without acute diverticulitis. Pelvis: No pelvic mass. The bladder appears unremarkable. No free fluid is identified in the pelvis. No bulky pelvic lymphadenopathy. Peritoneum/Retroperitoneum: No aortic aneurysm. No retroperitoneal or mesenteric lymphadenopathy. Bones/Soft Tissues: Within the subcutaneous fat of the left buttocks along the gluteal crease, axial image 240, sagittal image 78, there is a 1.9 x 8.9 x 3.5 cm mixed fluid and gas collection felt to represent a subcutaneous abscess, with measurements in transverse, craniocaudal, and AP dimensions respectively. Adjacent stranding is seen consistent with cellulitis.   The abscess collection extends cephalad into the perianal region on and around axial image 216. Subcutaneous abscess and associated adjacent cellulitis seen within the left buttock along the gluteal crease, extending up towards the perianal region. Cholelithiasis without acute cholecystitis. No other acute abnormality.        Electronically signed by Evelyn Myers DO on 5/1/2022 at 7:25 PM

## 2022-05-01 NOTE — PROGRESS NOTES
[facility] [address]  Pharmacy consulted by Debi Aleman MD  for monitoring and adjustment. Indication for treatment: skin & soft tissue; abcess buttocks  Goal trough: [x] 10-15 mcg/mL or [] 15-20 mcg/ml  AUC/SHAYY: [] <500 or [x] 400-600    Pertinent Laboratory Values:   Temp Readings from Last 3 Encounters:   05/01/22 98.2 °F (36.8 °C) (Oral)   04/26/22 98.2 °F (36.8 °C) (Oral)   04/09/18 98 °F (36.7 °C) (Oral)     Recent Labs     04/29/22  2300 05/01/22  1223   WBC 16.7* 10.9*   LACTATE 1.5  --      Recent Labs     04/29/22  2300 05/01/22  1223   BUN 14 11   CREATININE 0.7* 0.5*     Estimated Creatinine Clearance: 261 mL/min (A) (based on SCr of 0.5 mg/dL (L)). Intake/Output Summary (Last 24 hours) at 5/1/2022 1515  Last data filed at 4/30/2022 1922  Gross per 24 hour   Intake 1900 ml   Output --   Net 1900 ml       Pertinent Cultures:  Date    Source    Results  4/29                          blood x 2                                    pending    Vancomycin level:   TROUGH:  No results for input(s): VANCOTROUGH in the last 72 hours. RANDOM:    Recent Labs     05/01/22  1223   VANCORANDOM 8.9       Assessment:  · SCr, BUN, and urine output: Renal at baseline, scr= 0.7, crcl > 100. · I/O = pending  · Day(s) of therapy: 1  · Vancomycin concentration: 8.9 on 5/1,     Plan:  · 55 yom, 127kg obese. · Renal at baseline, scr= 0.7, crcl > 100.   · Previous doses  · Vancomycin 2250mg IV x 1 on 4/30 at 0015  · Vancomycin 1500mg IV x 2 (4/30, 5/1)  · Continue with vancomycin 1500mg IV q12h   · Predicted  and trough 12.4  · Pharmacy will continue to monitor patient and adjust therapy as indicated    VANCOMYCIN CONCENTRATION SCHEDULED FOR 5/3 at 0600    Thank you for the consult.  Ciera Birmingham, [facility]  5/1/2022 3:15 PM

## 2022-05-02 VITALS
BODY MASS INDEX: 39.71 KG/M2 | TEMPERATURE: 97.7 F | SYSTOLIC BLOOD PRESSURE: 142 MMHG | HEART RATE: 89 BPM | OXYGEN SATURATION: 92 % | RESPIRATION RATE: 16 BRPM | HEIGHT: 72 IN | WEIGHT: 293.21 LBS | DIASTOLIC BLOOD PRESSURE: 85 MMHG

## 2022-05-02 LAB
ANION GAP SERPL CALCULATED.3IONS-SCNC: 10 MMOL/L (ref 4–16)
BASOPHILS ABSOLUTE: 0.1 K/CU MM
BASOPHILS RELATIVE PERCENT: 1.2 % (ref 0–1)
BUN BLDV-MCNC: 10 MG/DL (ref 6–23)
CALCIUM SERPL-MCNC: 8 MG/DL (ref 8.3–10.6)
CHLORIDE BLD-SCNC: 106 MMOL/L (ref 99–110)
CO2: 23 MMOL/L (ref 21–32)
CREAT SERPL-MCNC: 0.5 MG/DL (ref 0.9–1.3)
CULTURE: ABNORMAL
CULTURE: ABNORMAL
DIFFERENTIAL TYPE: ABNORMAL
EOSINOPHILS ABSOLUTE: 0.2 K/CU MM
EOSINOPHILS RELATIVE PERCENT: 2 % (ref 0–3)
GFR AFRICAN AMERICAN: >60 ML/MIN/1.73M2
GFR NON-AFRICAN AMERICAN: >60 ML/MIN/1.73M2
GLUCOSE BLD-MCNC: 181 MG/DL (ref 70–99)
GLUCOSE BLD-MCNC: 191 MG/DL (ref 70–99)
GLUCOSE BLD-MCNC: 203 MG/DL (ref 70–99)
GLUCOSE BLD-MCNC: 257 MG/DL (ref 70–99)
HCT VFR BLD CALC: 40.9 % (ref 42–52)
HEMOGLOBIN: 13.7 GM/DL (ref 13.5–18)
IMMATURE NEUTROPHIL %: 2.5 % (ref 0–0.43)
LYMPHOCYTES ABSOLUTE: 2.6 K/CU MM
LYMPHOCYTES RELATIVE PERCENT: 23 % (ref 24–44)
Lab: ABNORMAL
MCH RBC QN AUTO: 30 PG (ref 27–31)
MCHC RBC AUTO-ENTMCNC: 33.5 % (ref 32–36)
MCV RBC AUTO: 89.5 FL (ref 78–100)
MONOCYTES ABSOLUTE: 0.8 K/CU MM
MONOCYTES RELATIVE PERCENT: 7.4 % (ref 0–4)
NUCLEATED RBC %: 0 %
PDW BLD-RTO: 11.9 % (ref 11.7–14.9)
PLATELET # BLD: 358 K/CU MM (ref 140–440)
PMV BLD AUTO: 10 FL (ref 7.5–11.1)
POTASSIUM SERPL-SCNC: 3.8 MMOL/L (ref 3.5–5.1)
RBC # BLD: 4.57 M/CU MM (ref 4.6–6.2)
SEGMENTED NEUTROPHILS ABSOLUTE COUNT: 7.2 K/CU MM
SEGMENTED NEUTROPHILS RELATIVE PERCENT: 63.9 % (ref 36–66)
SODIUM BLD-SCNC: 139 MMOL/L (ref 135–145)
SPECIMEN: ABNORMAL
TOTAL IMMATURE NEUTOROPHIL: 0.28 K/CU MM
TOTAL NUCLEATED RBC: 0 K/CU MM
WBC # BLD: 11.2 K/CU MM (ref 4–10.5)

## 2022-05-02 PROCEDURE — 6370000000 HC RX 637 (ALT 250 FOR IP): Performed by: HOSPITALIST

## 2022-05-02 PROCEDURE — 85025 COMPLETE CBC W/AUTO DIFF WBC: CPT

## 2022-05-02 PROCEDURE — 82962 GLUCOSE BLOOD TEST: CPT

## 2022-05-02 PROCEDURE — 6360000002 HC RX W HCPCS: Performed by: HOSPITALIST

## 2022-05-02 PROCEDURE — 36415 COLL VENOUS BLD VENIPUNCTURE: CPT

## 2022-05-02 PROCEDURE — 6370000000 HC RX 637 (ALT 250 FOR IP): Performed by: INTERNAL MEDICINE

## 2022-05-02 PROCEDURE — 80048 BASIC METABOLIC PNL TOTAL CA: CPT

## 2022-05-02 PROCEDURE — 2580000003 HC RX 258: Performed by: HOSPITALIST

## 2022-05-02 PROCEDURE — 94761 N-INVAS EAR/PLS OXIMETRY MLT: CPT

## 2022-05-02 PROCEDURE — 99024 POSTOP FOLLOW-UP VISIT: CPT | Performed by: SURGERY

## 2022-05-02 RX ORDER — LISINOPRIL 5 MG/1
5 TABLET ORAL DAILY
Qty: 30 TABLET | Refills: 0 | Status: SHIPPED | OUTPATIENT
Start: 2022-05-03

## 2022-05-02 RX ORDER — INSULIN GLARGINE 100 [IU]/ML
35 INJECTION, SOLUTION SUBCUTANEOUS NIGHTLY
Qty: 10 ML | Refills: 0 | Status: SHIPPED | OUTPATIENT
Start: 2022-05-02 | End: 2022-06-01

## 2022-05-02 RX ORDER — AMOXICILLIN AND CLAVULANATE POTASSIUM 875; 125 MG/1; MG/1
1 TABLET, FILM COATED ORAL 2 TIMES DAILY
Qty: 14 TABLET | Refills: 0 | Status: SHIPPED | OUTPATIENT
Start: 2022-05-02 | End: 2022-05-09

## 2022-05-02 RX ADMIN — INSULIN LISPRO 6 UNITS: 100 INJECTION, SOLUTION INTRAVENOUS; SUBCUTANEOUS at 08:55

## 2022-05-02 RX ADMIN — ACETAMINOPHEN 650 MG: 325 TABLET ORAL at 06:41

## 2022-05-02 RX ADMIN — INSULIN LISPRO 4 UNITS: 100 INJECTION, SOLUTION INTRAVENOUS; SUBCUTANEOUS at 12:05

## 2022-05-02 RX ADMIN — SODIUM CHLORIDE: 9 INJECTION, SOLUTION INTRAVENOUS at 09:00

## 2022-05-02 RX ADMIN — INSULIN LISPRO 1 UNITS: 100 INJECTION, SOLUTION INTRAVENOUS; SUBCUTANEOUS at 03:12

## 2022-05-02 RX ADMIN — LISINOPRIL 5 MG: 5 TABLET ORAL at 08:54

## 2022-05-02 RX ADMIN — METFORMIN HYDROCHLORIDE 500 MG: 500 TABLET ORAL at 08:53

## 2022-05-02 RX ADMIN — SODIUM CHLORIDE, PRESERVATIVE FREE 10 ML: 5 INJECTION INTRAVENOUS at 08:53

## 2022-05-02 RX ADMIN — CEFEPIME HYDROCHLORIDE 2000 MG: 2 INJECTION, POWDER, FOR SOLUTION INTRAVENOUS at 10:49

## 2022-05-02 RX ADMIN — ENOXAPARIN SODIUM 30 MG: 100 INJECTION SUBCUTANEOUS at 08:53

## 2022-05-02 RX ADMIN — FAMOTIDINE 20 MG: 20 TABLET ORAL at 08:54

## 2022-05-02 RX ADMIN — VANCOMYCIN HYDROCHLORIDE 1500 MG: 5 INJECTION, POWDER, LYOPHILIZED, FOR SOLUTION INTRAVENOUS at 05:10

## 2022-05-02 ASSESSMENT — PAIN DESCRIPTION - DESCRIPTORS: DESCRIPTORS: ACHING

## 2022-05-02 ASSESSMENT — PAIN SCALES - GENERAL: PAINLEVEL_OUTOF10: 3

## 2022-05-02 ASSESSMENT — PAIN DESCRIPTION - LOCATION: LOCATION: HEAD

## 2022-05-02 NOTE — PROGRESS NOTES
GENERAL SURGERY PROGRESS NOTE    Juan R Ocampo is a 55 y.o. male s/p I&D of perirectal abscess. Subjective:  Doing ok today. Pain improving. Denies fevers or chills. Objective:    Vitals: VITALS:  BP (!) 142/85 Comment:   Pulse 89   Temp 97.7 °F (36.5 °C) (Oral)   Resp 16   Ht 6' (1.829 m)   Wt 293 lb 3.4 oz (133 kg)   SpO2 92%   BMI 39.77 kg/m²     I/O: No intake/output data recorded. Labs/Imaging Results:   Lab Results   Component Value Date     05/02/2022    K 3.8 05/02/2022     05/02/2022    CO2 23 05/02/2022    BUN 10 05/02/2022    CREATININE 0.5 05/02/2022    GLUCOSE 181 05/02/2022    CALCIUM 8.0 05/02/2022      Lab Results   Component Value Date    WBC 11.2 (H) 05/02/2022    HGB 13.7 05/02/2022    HCT 40.9 (L) 05/02/2022    MCV 89.5 05/02/2022     05/02/2022       IV Fluids: dextrose    sodium chloride    sodium chloride Last Rate: 150 mL/hr at 05/02/22 0900    Scheduled Meds:   metFORMIN, 500 mg, Oral, BID WC    lisinopril, 5 mg, Oral, Daily    insulin lispro, 0-12 Units, SubCUTAneous, TID WC    insulin lispro, 0-6 Units, SubCUTAneous, 2 times per day    insulin glargine, 35 Units, SubCUTAneous, Nightly    sodium chloride flush, 5-40 mL, IntraVENous, 2 times per day    enoxaparin, 30 mg, SubCUTAneous, BID    famotidine, 20 mg, Oral, BID    cefepime, 2,000 mg, IntraVENous, Q12H    vancomycin, 1,500 mg, IntraVENous, Q12H    morphine, 4 mg, IntraVENous, Once    ondansetron, 4 mg, IntraVENous, Once    Physical Exam:  General: A&O x 3, no distress. HEENT: Anicteric sclerae, MMM. Extremities: No edema bilat LE. Buttock: dressing changed today, still fairly indurated but improving      Assessment and Plan:  55 y.o. male s/p I&D of perirectal abscess. Improving.     Patient Active Problem List:     Abscess of buttock, left      - ok for discharge from a surgical perspective with daily dressing changes to buttock wound and 1 week of PO antibiotics  - may need home health for assistance with dressings at home  - follow up with me in clinic in 1 week  - will sign off    Rowena Ca MD

## 2022-05-02 NOTE — DISCHARGE SUMMARY
V2.0  Discharge Summary    Name:  Lian James /Age/Sex: 1975 (55 y.o. male)   Admit Date: 2022  Discharge Date: 22    MRN & CSN:  [id number] & [id number] Encounter Date and Time 22 2:59 PM EDT    Attending:  No att. providers found Discharging Provider: Claudetta Norway, DO       Hospital Course:     Brief HPI: Lian James is a 55 y.o. male with no prior medical history admitted with left buttock abscess.  Found to have markedly elevated blood glucose new diagnosis of type 2 diabetes.     Brief Problem Based Course:    -Left buttock abscess with cellulitis status post incision and drainage by general surgery. Daily wound dressings, received IV vancomycin and cefepime, resolved leukocytosis. Patient is getting discharged on oral Augmentin 875 mg for 7 days    -Sepsis likely due to left buttocks abscess. Managed as above discharged home with home health to help with wound dressing    -New onset diabetes type 2 started on insulin Levemir 35 units nightly as well as metformin 500 mg p.o. twice daily. Education provided at bedside by nursing staff. Patient seen by endocrinologist.  Leah Pat in 2 weeks.      The patient expressed appropriate understanding of, and agreement with the discharge recommendations, medications, and plan. Consults this admission:  PHARMACY TO DOSE VANCOMYCIN  IP CONSULT TO GENERAL SURGERY  IP CONSULT TO HOSPITALIST  PHARMACY TO DOSE VANCOMYCIN  IP CONSULT TO ENDOCRINOLOGY  IP CONSULT TO HOME CARE NEEDS    Discharge Diagnosis:   Abscess of buttock, left  Sepsis secondary to buttock abscess  New onset diabetes    Discharge Instruction:   Follow up appointments: With endocrinologist in 2 weeks  Primary care physician: No primary care provider on file.  within 2 weeks  Diet: diabetic diet   Activity: activity as tolerated  Disposition: Discharged to:   []Home, []C, []SNF, []Acute Rehab, []Hospice   Condition on discharge: Stable  Labs and Tests to be Followed up as an outpatient by PCP or Specialist: School has surgery with    Discharge Medications:        Medication List      START taking these medications    amoxicillin-clavulanate 875-125 MG per tablet  Commonly known as: AUGMENTIN  Take 1 tablet by mouth 2 times daily for 7 days     insulin glargine 100 UNIT/ML injection vial  Commonly known as: LANTUS  Inject 35 Units into the skin nightly     lisinopril 5 MG tablet  Commonly known as: PRINIVIL;ZESTRIL  Take 1 tablet by mouth daily  Start taking on: May 3, 2022        STOP taking these medications    sulfamethoxazole-trimethoprim 800-160 MG per tablet  Commonly known as: Bactrim DS           Where to Get Your Medications      These medications were sent to [address] [place] [address] 25, [address] [id number]    Phone: [phone]   amoxicillin-clavulanate 875-125 MG per tablet  insulin glargine 100 UNIT/ML injection vial  lisinopril 5 MG tablet        Objective Findings at Discharge:   BP (!) 142/85 Comment:   Pulse 89   Temp 97.7 °F (36.5 °C) (Oral)   Resp 16   Ht 6' (1.829 m)   Wt 293 lb 3.4 oz (133 kg)   SpO2 92%   BMI 39.77 kg/m²       Physical Exam:   General: NAD  Eyes: EOMI  ENT: neck supple  Cardiovascular: Regular rate. Respiratory: Clear to auscultation  Gastrointestinal: Soft, non tender  Genitourinary: no suprapubic tenderness  Musculoskeletal: No edema  Skin: warm, dry  Neuro: Alert. Psych: Mood appropriate. Labs and Imaging   CT ABDOMEN PELVIS W IV CONTRAST Additional Contrast? None    Result Date: 4/30/2022  EXAMINATION: CT OF THE ABDOMEN AND PELVIS WITH CONTRAST 4/30/2022 12:44 am TECHNIQUE: CT of the abdomen and pelvis was performed with the administration of intravenous contrast. Multiplanar reformatted images are provided for review.  Dose modulation, iterative reconstruction, and/or weight based adjustment of the mA/kV was utilized to reduce the radiation dose to as low as reasonably achievable. COMPARISON: None. HISTORY: ORDERING SYSTEM PROVIDED HISTORY: worsening left buttock abscess/cellulitis TECHNOLOGIST PROVIDED HISTORY: Reason for exam:->worsening left buttock abscess/cellulitis Additional Contrast?->None Decision Support Exception - unselect if not a suspected or confirmed emergency medical condition->Emergency Medical Condition (MA) Reason for Exam: worsening left buttock abscess/cellulitis FINDINGS: Lower Chest: No parenchymal infiltrate or pleural effusion. No pericardial effusion. No distal esophageal thickening is identified. Organs: No enhancing mass in the liver or spleen. Cholelithiasis. No adrenal mass. No pancreatic mass. No peripancreatic inflammatory process. No enhancing renal mass. No urinary tract calculi or obstruction is identified. No periureteral stranding. GI/Bowel: No ileus or obstruction is identified. The appendix appears normal.  Diverticulosis without acute diverticulitis. Pelvis: No pelvic mass. The bladder appears unremarkable. No free fluid is identified in the pelvis. No bulky pelvic lymphadenopathy. Peritoneum/Retroperitoneum: No aortic aneurysm. No retroperitoneal or mesenteric lymphadenopathy. Bones/Soft Tissues: Within the subcutaneous fat of the left buttocks along the gluteal crease, axial image 240, sagittal image 78, there is a 1.9 x 8.9 x 3.5 cm mixed fluid and gas collection felt to represent a subcutaneous abscess, with measurements in transverse, craniocaudal, and AP dimensions respectively. Adjacent stranding is seen consistent with cellulitis. The abscess collection extends cephalad into the perianal region on and around axial image 216. Subcutaneous abscess and associated adjacent cellulitis seen within the left buttock along the gluteal crease, extending up towards the perianal region. Cholelithiasis without acute cholecystitis.  No other acute abnormality. CBC:   Recent Labs     04/29/22 2300 05/01/22  1223 05/02/22  0439   WBC 16.7* 10.9* 11.2*   HGB 15.6 13.9 13.7    333 358     BMP:    Recent Labs     04/29/22 2300 05/01/22  1223 05/02/22  0439   * 133* 139   K 4.0 4.3 3.8   CL 99 100 106   CO2 17* 18* 23   BUN 14 11 10   CREATININE 0.7* 0.5* 0.5*   GLUCOSE 439* 302* 181*     Hepatic:   Recent Labs     04/29/22 2300   AST 14*   ALT 21   BILITOT 0.5   ALKPHOS 140*     Lipids: No results found for: CHOL, HDL, TRIG  Hemoglobin A1C:   Lab Results   Component Value Date    LABA1C 12.0 05/01/2022     TSH: No results found for: TSH  Troponin: No results found for: TROPONINT  Lactic Acid: No results for input(s): LACTA in the last 72 hours. BNP: No results for input(s): PROBNP in the last 72 hours.   UA:No results found for: Janyce Cost, PHUR, LABCAST, WBCUA, RBCUA, MUCUS, TRICHOMONAS, YEAST, BACTERIA, CLARITYU, SPECGRAV, LEUKOCYTESUR, UROBILINOGEN, BILIRUBINUR, BLOODU, GLUCOSEU, KETUA, AMORPHOUS  Urine Cultures: No results found for: LABURIN  Blood Cultures: No results found for: BC  No results found for: BLOODCULT2  Organism: No results found for: ORG    Time Spent Discharging patient 45 minutes    Electronically signed by Lillian Iyer DO on 5/2/2022 at 2:59 PM

## 2022-05-02 NOTE — PROGRESS NOTES
[address] [facility]  consulted by Dr. Lizeth Jolly for monitoring and adjustment. Indication for treatment: skin & soft tissue; abcess buttocks  Goal trough: [x] 10-15 mcg/mL or [] 15-20 mcg/ml  AUC/SHAYY: [] <500 or [x] 400-600    Pertinent Laboratory Values:   Temp Readings from Last 3 Encounters:   05/02/22 97.7 °F (36.5 °C) (Oral)   04/26/22 98.2 °F (36.8 °C) (Oral)   04/09/18 98 °F (36.7 °C) (Oral)     Recent Labs     04/29/22  2300 05/01/22  1223 05/02/22  0439   WBC 16.7* 10.9* 11.2*   LACTATE 1.5  --   --      Recent Labs     04/29/22  2300 05/01/22  1223 05/02/22  0439   BUN 14 11 10   CREATININE 0.7* 0.5* 0.5*     Estimated Creatinine Clearance: 261 mL/min (A) (based on SCr of 0.5 mg/dL (L)). No intake or output data in the 24 hours ending 05/02/22 1243    Pertinent Cultures:  Date    Source    Results  4/29   Blood    NGTD  4/30   Wound    Beta Strep Grp C  4/30   Body Fluid (abscess)  Beta Strep Grp C    Vancomycin level:   TROUGH:  No results for input(s): VANCOTROUGH in the last 72 hours.   RANDOM:    Recent Labs     05/01/22  1223   VANCORANDOM 8.9       Assessment:  · SCr, BUN, and urine output:  · Scr trends are stable, at baseline, WNL  · Day(s) of therapy: 3 of 7  · Vancomycin concentration:   · 5/1: 8.9, collected 9h post-dose, , therapeutic    Plan:  · Continue vancomycin 1500 mg IVPB q12h  · Predicted trough: 11.7,   · Repeat next level in 2 days  · Pharmacy will continue to monitor patient and adjust therapy as indicated    Ellie 3 5/4 @ 6941    Thank you for the consult,  Ann Espinoza [facility], PharmD  5/2/2022 12:43 PM

## 2022-05-02 NOTE — DISCHARGE INSTR - COC
Continuity of Care Form    Patient Name: Teresa Cash   :  1975  MRN:  [id number]    Admit date:  2022  Discharge date:  ***    Code Status Order: Full Code   Advance Directives:      Admitting Physician:  Phil Ricardo MD  PCP: No primary care provider on file. Discharging Nurse: [facility] Unit/Room#: 1108/1108-A  Discharging Unit Phone Number: ***    Emergency Contact:   Extended Emergency Contact Information  [facility]  Address:  [address] [facility] [address], [address] [address] Phone: [phone]  Relation: Other    Past Surgical History:  History reviewed. No pertinent surgical history. Immunization History: There is no immunization history on file for this patient.     Active Problems:  Patient Active Problem List   Diagnosis Code    Abscess of buttock, left L02.31       Isolation/Infection:   Isolation            No Isolation          Patient Infection Status       None to display            Nurse Assessment:  Last Vital Signs: BP (!) 142/85 Comment:   Pulse 89   Temp 97.7 °F (36.5 °C) (Oral)   Resp 16   Ht 6' (1.829 m)   Wt 293 lb 3.4 oz (133 kg)   SpO2 92%   BMI 39.77 kg/m²     Last documented pain score (0-10 scale): Pain Level: 3  Last Weight:   Wt Readings from Last 1 Encounters:   22 293 lb 3.4 oz (133 kg)     Mental Status:  {IP PT MENTAL STATUS:37884}    IV Access:  { RL IV ACCESS:670519030}    Nursing Mobility/ADLs:  Walking   {P DME HWHX:598356929}  Transfer  {P DME WNFD:801600286}  Bathing  {P DME JMSZ:612951656}  Dressing  {CHP DME WIMU:051967444}  Toileting  {P DME FMHM:214979482}  Feeding  {P DME XXCY:837594202}  Med Admin  {P DME FQMM:226818435}  Med Delivery   { RL MED Delivery:813193612}    Wound Care Documentation and Therapy:  Wound 22 Buttocks Right (Active)   Dressing Status New dressing applied 22 0852   Wound Cleansed Other (Comment) 22 0852   Dressing/Treatment Dry dressing 22 0852   Dressing Change Due 22 0852   Wound Assessment Pink/red 22   Drainage Amount Moderate 22   Drainage Description Serosanguinous 22   Number of days: 2        Elimination:  Continence: Bowel: {YES / JA:78838}  Bladder: {YES / UE:17456}  Urinary Catheter: {Urinary Catheter:122914354}   Colostomy/Ileostomy/Ileal Conduit: {YES / L}       Date of Last BM: ***  No intake or output data in the 24 hours ending 22 141  I/O last 3 completed shifts:   In: 0 [I.V.:]  Out: -     Safety Concerns:     [employer] Safety Concerns:738000100}    Impairments/Disabilities:      [employer] Impairments/Disabilities:566469432}    Nutrition Therapy:  Current Nutrition Therapy:   [employer] Diet List:292654754}    Routes of Feeding: {CHP DME Other Feedings:662917296}  Liquids: {Slp liquid thickness:36638}  Daily Fluid Restriction: {CHP DME Yes amt example:067658939}  Last Modified Barium Swallow with Video (Video Swallowing Test): {Done Not Done LTNT:010374012}    Treatments at the Time of Hospital Discharge:   Respiratory Treatments: ***  Oxygen Therapy:  {Therapy; copd oxygen:45511}  Ventilator:    { CC Vent WQHZ:816187637}    Rehab Therapies: {THERAPEUTIC INTERVENTION:8729260452}  Weight Bearing Status/Restrictions: [employer] Weight Bearin}  Other Medical Equipment (for information only, NOT a DME order):  {EQUIPMENT:712115995}  Other Treatments: ***    Patient's personal belongings (please select all that are sent with patient):  {P DME Belongings:271554011}    RN SIGNATURE:  {Esignature:295857856}    CASE MANAGEMENT/SOCIAL WORK SECTION    Inpatient Status Date: ***    Readmission Risk Assessment Score:  Readmission Risk              Risk of Unplanned Readmission:  12           Discharging to Facility/ Agency   Name:   Address:  Phone:  Fax:    Dialysis Facility (if applicable) Name:  Address:  Dialysis Schedule:  Phone:  Fax:    / signature: {Esignature:565361185}    PHYSICIAN SECTION    Prognosis: {Prognosis:2131560336}    Condition at Discharge: Mario Schroeder Patient Condition:295615144}    Rehab Potential (if transferring to Rehab): {Prognosis:3724548903}    Recommended Labs or Other Treatments After Discharge: ***    Physician Certification: I certify the above information and transfer of [place]  is necessary for the continuing treatment of the diagnosis listed and that he requires {Admit to Appropriate Level of Care:65169} for {GREATER/LESS:583113850} 30 days.      Update Admission H&P: {CHP DME Changes in XFVWY:977523674}    PHYSICIAN SIGNATURE:  {Esignature:850373209}

## 2022-05-02 NOTE — CARE COORDINATION
LSW noted Pt has a discharge for today. Pt does not have a PCP on file. Pt is not able to get home care for wound care needs. LSW sent PS to general surgery to see if they will follow for home care. Return PS from Dr. Stacia Garcia. He is willing for follow for home care needs. F/u with Pt in the room. Pt has no preference in home care agency. Call to [facility] with the referral. Discharge information sent to [facility] via fax.

## 2022-05-04 ENCOUNTER — OFFICE VISIT (OUTPATIENT)
Dept: FAMILY MEDICINE CLINIC | Age: 47
End: 2022-05-04
Payer: COMMERCIAL

## 2022-05-04 VITALS
HEART RATE: 129 BPM | TEMPERATURE: 97.7 F | HEIGHT: 72 IN | WEIGHT: 290.4 LBS | BODY MASS INDEX: 39.33 KG/M2 | DIASTOLIC BLOOD PRESSURE: 98 MMHG | SYSTOLIC BLOOD PRESSURE: 120 MMHG | OXYGEN SATURATION: 99 %

## 2022-05-04 DIAGNOSIS — Z09 HOSPITAL DISCHARGE FOLLOW-UP: Primary | ICD-10-CM

## 2022-05-04 DIAGNOSIS — Z78.9 CAFFEINE USE: ICD-10-CM

## 2022-05-04 DIAGNOSIS — Z72.0 TOBACCO ABUSE: ICD-10-CM

## 2022-05-04 DIAGNOSIS — E66.9 OBESITY, CLASS II, BMI 35-39.9: ICD-10-CM

## 2022-05-04 DIAGNOSIS — I10 ESSENTIAL HYPERTENSION: ICD-10-CM

## 2022-05-04 DIAGNOSIS — E11.9 NEW ONSET TYPE 2 DIABETES MELLITUS (HCC): ICD-10-CM

## 2022-05-04 DIAGNOSIS — L02.31 LEFT BUTTOCK ABSCESS: ICD-10-CM

## 2022-05-04 LAB
CULTURE: ABNORMAL
CULTURE: ABNORMAL
Lab: ABNORMAL
SPECIMEN: ABNORMAL

## 2022-05-04 PROCEDURE — 99496 TRANSJ CARE MGMT HIGH F2F 7D: CPT | Performed by: FAMILY MEDICINE

## 2022-05-04 PROCEDURE — 1111F DSCHRG MED/CURRENT MED MERGE: CPT | Performed by: FAMILY MEDICINE

## 2022-05-04 SDOH — ECONOMIC STABILITY: FOOD INSECURITY: WITHIN THE PAST 12 MONTHS, YOU WORRIED THAT YOUR FOOD WOULD RUN OUT BEFORE YOU GOT MONEY TO BUY MORE.: NEVER TRUE

## 2022-05-04 SDOH — ECONOMIC STABILITY: FOOD INSECURITY: WITHIN THE PAST 12 MONTHS, THE FOOD YOU BOUGHT JUST DIDN'T LAST AND YOU DIDN'T HAVE MONEY TO GET MORE.: NEVER TRUE

## 2022-05-04 ASSESSMENT — SOCIAL DETERMINANTS OF HEALTH (SDOH): HOW HARD IS IT FOR YOU TO PAY FOR THE VERY BASICS LIKE FOOD, HOUSING, MEDICAL CARE, AND HEATING?: NOT VERY HARD

## 2022-05-04 ASSESSMENT — PATIENT HEALTH QUESTIONNAIRE - PHQ9
SUM OF ALL RESPONSES TO PHQ QUESTIONS 1-9: 0
SUM OF ALL RESPONSES TO PHQ9 QUESTIONS 1 & 2: 0
1. LITTLE INTEREST OR PLEASURE IN DOING THINGS: 0
2. FEELING DOWN, DEPRESSED OR HOPELESS: 0
SUM OF ALL RESPONSES TO PHQ QUESTIONS 1-9: 0

## 2022-05-04 NOTE — PROGRESS NOTES
Progress Note( Dr. Dat Trujillo)  5/3/2022  Subjective:   Admit Date: 4/29/2022  PCP: No primary care provider on file. Admitted For :Left sided Yun rectal abscess      Consulted For: Better control of blood glucose       Interval History: feels a bit better  Still has some pain at operiatng site     Denies any chest pains,   Denies SOB . Denies nausea or vomiting. No new bowel or bladder symptoms. No intake or output data in the 24 hours ending 05/03/22 2005    DATA    CBC: Recent Labs     05/01/22  1223 05/02/22  0439   WBC 10.9* 11.2*   HGB 13.9 13.7    358    CMP:  Recent Labs     05/01/22  1223 05/02/22  0439   * 139   K 4.3 3.8    106   CO2 18* 23   BUN 11 10   CREATININE 0.5* 0.5*   CALCIUM 8.0* 8.0*     Lipids: No results found for: CHOL, HDL, TRIG  Glucose:  Recent Labs     05/02/22  0232 05/02/22  0636 05/02/22  1112   POCGLU 191* 257* 203*     [id number]:  Lab Results   Component Value Date    LABA1C 12.0 05/01/2022     High Sensitivity TSH: No results found for: TSHHS  Free T3: No results found for: FT3  Free T4:No results found for: T4FREE    CT ABDOMEN PELVIS W IV CONTRAST Additional Contrast? None   Final Result   Subcutaneous abscess and associated adjacent cellulitis seen within the left   buttock along the gluteal crease, extending up towards the perianal region. Cholelithiasis without acute cholecystitis. No other acute abnormality.               Scheduled Medicines   Medications:    Infusions:       Objective:   Vitals: BP (!) 142/85 Comment:   Pulse 89   Temp 97.7 °F (36.5 °C) (Oral)   Resp 16   Ht 6' (1.829 m)   Wt 293 lb 3.4 oz (133 kg)   SpO2 92%   BMI 39.77 kg/m²   General appearance: alert and cooperative with exam  Neck: no JVD or bruit  Thyroid : Normal lobes   Lungs: Has Vesicular Breath sounds   Heart:  regular rate and rhythm  Abdomen: soft, non-tender; bowel sounds normal; no masses,  no organomegaly    Perirectal area has Abscess drained   Musculoskeletal: Normal  Extremities: extremities normal, , no edema  Neurologic:  Awake, alert, oriented to name, place and time. Cranial nerves II-XII are grossly intact. Motor is  intact. Sensory is intact. ,  and gait is normal.    Assessment:     Patient Active Problem List:     Abscess of buttock, left      new onset diabetes mellitus    Plan:     1. Reviewed POC blood glucose . Labs and X ray results   2. Reviewed Current Medicines   3. On  Correction bolus Humalog/ Basal Lantus Insulin regime / and Oral Hypoglycemic drugs   4. Monitor Blood glucose frequently   5. Modified  the dose of Insulin/ other medicines as needed   6. Will follow     .      Radha Tyson MD, MD

## 2022-05-04 NOTE — PATIENT INSTRUCTIONS
PLEASE BRING YOUR MEDICATIONS TO ALL APPOINTMENTS      Please check MY CHART for test results. If you have forgotten your password, please call [phone]. The diagnoses and medications listed in this after visit summary may not be up to date. Please check MY CHART in 28-48 hours for possible corrections. Late cancellation policy: So that we can better accommodate people who are sick, please give our office 24 hour notice for an appointment cancellation. Missed appointments: Your care is very important to us. It is important that you keep your scheduled appointments. Multiple missed appointments will lead to a dismissal from the office. Patients arriving late will be worked into the schedule as time permits, with patients arriving on time taken as scheduled. Late arriving patients are more than welcome to wait or reschedule their appointments. Please allow 5-7 business days for paperwork to be processed. HERE ARE SOME LIFE CHANGING TIPS      1. Take your blood pressure medications at bedtime to reduce your chance of heart attack or stroke  2. Fever in kids:  Give both Tylenol and Ibuprofen at the same time rather than staggering them   3. Follow these tips to reduce childhood obesity: Reduce unnecessary exposure to antibiotics, consume whole milk instead of skim milk, watch public TV instead of regular TV (less exposure to junk food commercials), and reduce traumatic experiences. 4. 1 egg per day is good for your heart  5. Alternate day fasting does promote weight loss. Skipping breakfast increases your risk of obesity  6. Artificially sweetened drinks increase all cause mortality (strokes, body mass index, cardiovascular disease)  7. Kale consumption can reduce onset of dementia  8. Walking at least 8000 steps per day and resistance exercise 2-3 x per week are good for your heart  9. Brushing teeth 3 times per day can decrease chance of getting diabetes  10.  Antibiotic use is associated with a lifetime increased risk of breast cancer and heart disease.

## 2022-05-04 NOTE — CONSULTS
Endocrinology   Consult Note  4/29/2022 10:29 PM      Primary Care provider: No primary care provider on file.      Referring physician:  hPil Ricardo MD      Dear Doctor Alana Chairez     Thank You for the Consult      Pt. Was Admitted for : Perirectal abscess left side     Reason for Consult: Better control of blood glucose        History Obtained From:  Patient/ EMR         HISTORY OF PRESENT ILLNESS:                 The patient is a 55 y.o. male with significant past medical history of none came with the complaints of perirectal abscess mostly left side. He was seen in the ER about a week ago and treated with limited I&D and oral antibiotic. But it did not help and actually got worse so he came back again couple days ago and this time was admitted. He he had extensive incision drainage done on 4/30/2022. Patient is not known diabetic but this time noted to have high blood glucose levels. I was  consulted for better control of blood glucose.         ROS:   Pt's ROS done in detail. Abnormal ROS are noted in Medical and Surgical History Section below:      Other Medical History:    Past Medical History    History reviewed. No pertinent past medical history. Surgical History:    Past Surgical History   History reviewed. No pertinent surgical history.        Allergies:  Patient has no known allergies.     Family History:   Family History   History reviewed. No pertinent family history. REVIEW OF SYSTEMS:  Review of System Done as noted above      PHYSICAL EXAM:       Vitals:    BP (!) 148/81   Pulse 99   Temp 98.2 °F (36.8 °C) (Oral)   Resp 17   Ht 6' (1.829 m)   Wt 293 lb 3.4 oz (133 kg)   SpO2 97%   BMI 39.77 kg/m²      CONSTITUTIONAL:  awake, alert, cooperative, appears stated age   EYES:  vision intact Fundoscopic Exam not performed   ENT:Normal  NECK:  Supple, No JVD.    Thyroid Exam:Normal   LUNGS:  Has Vesicular Breath Sounds,   CARDIOVASCULAR:  Normal apical impulse, regular rate and rhythm, normal S1 and S2, no S3 or S4, and has no  murmur   ABDOMEN:  No scars, normal bowel sounds, soft, non-distended, non-tender, no masses palpated, no hepatolienomegaly  Has perirectal abscess drained in depth and now has a drain and has been covered with dressing  Musculoskeletal: Normal  Extremities: Normal, peripheral pulses normal, , has no edema   NEUROLOGIC:  Awake, alert, oriented to name, place and time. Cranial nerves II-XII are grossly intact. Motor is  intact. Sensory is intact.  ,  and gait is normal.     DATA:         CBC: Recent Labs     04/29/22  2300   WBC 16.7*   HGB 15.6       CMP:  Recent Labs     04/29/22  2300   *   K 4.0   CL 99   CO2 17*   BUN 14   CREATININE 0.7*   CALCIUM 8.8   PROT 7.2   LABALBU 3.6   BILITOT 0.5   ALKPHOS 140*   AST 14*   ALT 21      Lipids: No results found for: CHOL, HDL, TRIG  Glucose:         Recent Labs     05/01/22  0211 05/01/22  0641 05/01/22  1110   POCGLU 212* 308* 210*      Hemoglobin A1C: No results found for: LABA1C  Free T4: No results found for: T4FREE  Free T3: No results found for: FT3  TSH High Sensitivity: No results found for: TSHHS     CT ABDOMEN PELVIS W IV CONTRAST Additional Contrast? None   Final Result   Subcutaneous abscess and associated adjacent cellulitis seen within the left   buttock along the gluteal crease, extending up towards the perianal region.       Cholelithiasis without acute cholecystitis.       No other acute abnormality.                    Scheduled Medicines   Medications:   Scheduled Medications    lisinopril  5 mg Oral Daily    insulin lispro  0-12 Units SubCUTAneous TID WC    insulin lispro  0-6 Units SubCUTAneous 2 times per day    insulin glargine  35 Units SubCUTAneous Nightly    insulin lispro  10 Units SubCUTAneous TID WC    sodium chloride flush  5-40 mL IntraVENous 2 times per day    enoxaparin  30 mg SubCUTAneous BID    famotidine  20 mg Oral BID    cefepime  2,000 mg IntraVENous Q12H    vancomycin  1,500 mg IntraVENous Q12H    [Held by provider] metFORMIN  500 mg Oral BID WC    morphine  4 mg IntraVENous Once    ondansetron  4 mg IntraVENous Once         Infusions:   Infusions Meds    dextrose      sodium chloride      sodium chloride 150 mL/hr at 05/01/22 9763               IMPRESSION         Patient Active Problem List   Diagnosis    Abscess of buttock, left        new onset diabetes mellitus        RECOMMENDATIONS:       1. Reviewed POC blood glucose . Labs and X ray results   2. Reviewed Home and Current Medicines   3. Will Start On meal/ Correction bolus Humalog/ Lantus Insulin regime   4. Monitor Blood glucose frequently   5.  Modify  the dose of Insulin  as needed         Will follow with you  Again thank you for sharing pt's care with me.      Truly yours,         Arnold Garber MD

## 2022-05-04 NOTE — PROGRESS NOTES
Post-Discharge Transitional Care  Follow Up      Ro Cover   YOB: 1975    Date of Office Visit:  5/4/2022  Date of Hospital Admission: 4/29/22  Date of Hospital Discharge: 5/2/22  Risk of hospital readmission (high >=14%. Medium >=10%) :Readmission Risk Score: 8.1 ( )      Care management risk score Rising risk (score 2-5) and Complex Care (Scores >=6): 0     Non face to face  following discharge, date last encounter closed (first attempt may have been earlier): *No documented post hospital discharge outreach found in the last 14 days    Call initiated 2 business days of discharge: *No response recorded in the last 14 days    ASSESSMENT/PLAN:   Below is the assessment and plan developed based on review of pertinent history, physical exam, labs, studies, and medications. Hospital discharge follow-up  -     [facility] (Ambulatory)  Caffeine use  Essential hypertension  Tobacco abuse  Obesity, Class II, BMI 35-39.9  New onset type 2 diabetes mellitus ([facility] [address]  -      DIABETES FOOT EXAM  Left buttock abscess      Medical Decision Making: high complexity  Return in about 2 weeks (around 5/18/2022) for DM, HTN. Subjective:   HPI:  Follow up of Hospital problems/diagnosis(es): Cellulitis left buttocks. New onset diabetes. Inpatient course: Discharge summary reviewed- see chart. Interval history/Current status: Diabetes: His blood sugar was just over 100 yesterday so he skipped his Lantus at night. Hypertension: He has not taken his lisinopril today. Abscess left buttocks: He had a nurse come out to his home yesterday. He sees surgeon for follow-up in the next 1 to 2 weeks.     Patient Active Problem List   Diagnosis    Abscess of buttock, left    Caffeine use    Essential hypertension    Tobacco abuse    Obesity, Class II, BMI 35-39.9       Medications listed as ordered at the time of discharge from hospital     Medication List Accurate as of May 4, 2022  2:40 PM. If you have any questions, ask your nurse or doctor. CONTINUE taking these medications    amoxicillin-clavulanate 875-125 MG per tablet  Commonly known as: AUGMENTIN  Take 1 tablet by mouth 2 times daily for 7 days     insulin glargine 100 UNIT/ML injection vial  Commonly known as: LANTUS  Inject 35 Units into the skin nightly     lisinopril 5 MG tablet  Commonly known as: PRINIVIL;ZESTRIL  Take 1 tablet by mouth daily              Medications marked \"taking\" at this time  Outpatient Medications Marked as Taking for the 5/4/22 encounter (Office Visit) with Carissa Ayon MD   Medication Sig Dispense Refill    insulin glargine (LANTUS) 100 UNIT/ML injection vial Inject 35 Units into the skin nightly 10 mL 0    amoxicillin-clavulanate (AUGMENTIN) 875-125 MG per tablet Take 1 tablet by mouth 2 times daily for 7 days 14 tablet 0        Medications patient taking as of now reconciled against medications ordered at time of hospital discharge: Yes    A comprehensive review of systems was negative except for what was noted in the HPI. Objective:    Patient-Reported Vitals  No data recorded    General Appearance: alert and oriented to person, place and time, well developed and well- nourished, in no acute distress. Obese. Smells of tobacco.  Patient is preferring to stand rather than sit due to his wound.   Skin: warm and dry, no rash or erythema  Head: normocephalic and atraumatic  Eyes: pupils equal, round, and reactive to light, extraocular eye movements intact, conjunctivae normal  ENT: tympanic membrane, external ear and ear canal normal bilaterally, nose without deformity, nasal mucosa and turbinates normal without polyps  Neck: supple and non-tender without mass, no thyromegaly or thyroid nodules, no cervical lymphadenopathy  Pulmonary/Chest: clear to auscultation bilaterally- no wheezes, rales or rhonchi, normal air movement, no respiratory distress  Cardiovascular: normal rate, regular rhythm, normal S1 and S2, no murmurs, rubs, clicks, or gallops, distal pulses intact,   Abdomen: soft, non-tender, non-distended, normal bowel sounds, no masses or organomegaly  Extremities: no cyanosis, clubbing or edema. Intact to monofilament testing. Musculoskeletal: normal range of motion, no joint swelling, deformity or tenderness  Neurologic: reflexes normal and symmetric, no cranial nerve deficit, gait, coordination and speech normal  Buttocks: Bandages are clean on the left buttocks. Wound not checked. Diagnosis Orders   1. Hospital discharge follow-up  [facility] (Ambulatory)   2. Caffeine use     3. Essential hypertension     4. Tobacco abuse     5. Obesity, Class II, BMI 35-39.9     6. New onset type 2 diabetes mellitus (HCC)  [facility] DIABETES FOOT EXAM   7. Left buttock abscess       Explained to patient that he should not skip his Lantus due to her sugar in the 100s. The Lantus is a 24-hour insulin and will work to keep his sugars down for the whole day. I recommend he take his Lantus in the morning so that he will not forget. Blood pressure is high today. This is because he did not take his medication. Weight loss and exercise recommended. Hold off on diabetic eye exam for a few months. Until we can get the blood sugars regulated. Switch from caffeine to water. Reviewed foods that can make his sugars go up. Re-check in 2 weeks    Patient did not get any diabetic education in the hospital per his reporting. We will have him meet with diabetic educator. Giovana Cohen, was evaluated through a synchronous (real-time) audio-video encounter. The patient (or guardian if applicable) is aware that this is a billable service, which includes applicable co-pays. This Virtual Visit was conducted with patient's (and/or legal guardian's) consent.  The visit was conducted pursuant to the emergency declaration under the [facility], [address] [place] [address] waiver authority and the [employer] General Act. Patient identification was verified, and a caregiver was present when appropriate. The patient was located at home in a state where the provider was licensed to provide care. An electronic signature was used to authenticate this note.   --Hermelindo Fuller MD

## 2022-05-05 LAB
CULTURE: NORMAL
CULTURE: NORMAL
Lab: NORMAL
Lab: NORMAL
SPECIMEN: NORMAL
SPECIMEN: NORMAL

## 2022-05-11 ENCOUNTER — OFFICE VISIT (OUTPATIENT)
Dept: SURGERY | Age: 47
End: 2022-05-11

## 2022-05-11 VITALS
OXYGEN SATURATION: 95 % | SYSTOLIC BLOOD PRESSURE: 130 MMHG | BODY MASS INDEX: 39.43 KG/M2 | HEIGHT: 72 IN | WEIGHT: 291.1 LBS | HEART RATE: 122 BPM | DIASTOLIC BLOOD PRESSURE: 84 MMHG

## 2022-05-11 DIAGNOSIS — Z09 POSTOPERATIVE EXAMINATION: Primary | ICD-10-CM

## 2022-05-11 PROCEDURE — 99024 POSTOP FOLLOW-UP VISIT: CPT | Performed by: SURGERY

## 2022-05-11 ASSESSMENT — PATIENT HEALTH QUESTIONNAIRE - PHQ9
1. LITTLE INTEREST OR PLEASURE IN DOING THINGS: 0
SUM OF ALL RESPONSES TO PHQ QUESTIONS 1-9: 0
SUM OF ALL RESPONSES TO PHQ9 QUESTIONS 1 & 2: 0
SUM OF ALL RESPONSES TO PHQ QUESTIONS 1-9: 0
2. FEELING DOWN, DEPRESSED OR HOPELESS: 0
SUM OF ALL RESPONSES TO PHQ QUESTIONS 1-9: 0
SUM OF ALL RESPONSES TO PHQ QUESTIONS 1-9: 0

## 2022-05-11 NOTE — LETTER
[address] [facility]  [facility]  Phone: [phone]  Fax: [phone]    Esmer Russell MD        May 11, 2022     Patient: Bronson Manuel   YOB: 1975   Date of Visit: 5/11/2022       To Whom It May Concern: It is my medical opinion that Bronson Manuel may return to work on 5/16/22 without restrictions. If you have any questions or concerns, please don't hesitate to call.     Sincerely,        Esmer Russell MD

## 2022-05-12 NOTE — PROGRESS NOTES
Post-Operative Clinic Note    Chief Complaint   Patient presents with    Post-Op Check     i & d of [facility]irectal abscess [facility] 04/30/[facility]         SUBJECTIVE:  Patient is here today for a post-operative visit. Patient is s/p perirectal abscess I&D on 4/30. Reports doing well. Pain improved. Asks about returning to work    No past surgical history on file. No past medical history on file. Family History   Problem Relation Age of Onset    Stroke Father     Lung Cancer Maternal Grandfather     Diabetes Maternal Aunt      Social History     Socioeconomic History    Marital status: Single     Spouse name: Not on file    Number of children: Not on file    Years of education: Not on file    Highest education level: Not on file   Occupational History    Not on file   Tobacco Use    Smoking status: Current Every Day Smoker     Packs/day: 1.00     Types: E-Cigarettes, Cigarettes    Smokeless tobacco: Never Used   Vaping Use    Vaping Use: Never used   Substance and Sexual Activity    Alcohol use: Yes    Drug use: No    Sexual activity: Yes   Other Topics Concern    Not on file   Social History Narrative    Not on file     Social Determinants of Health     Financial Resource Strain: Low Risk     Difficulty of Paying Living Expenses: Not very hard   Food Insecurity: No Food Insecurity    Worried About Running Out of Food in the Last Year: Never true    Kem of Food in the Last Year: Never true   Transportation Needs:     Lack of Transportation (Medical): Not on file    Lack of Transportation (Non-Medical):  Not on file   Physical Activity:     Days of Exercise per Week: Not on file    Minutes of Exercise per Session: Not on file   Stress:     Feeling of Stress : Not on file   Social Connections:     Frequency of Communication with Friends and Family: Not on file    Frequency of Social Gatherings with Friends and Family: Not on file    Attends Anabaptism Services: Not on file   [employer]s or Organizations: Not on file    Attends Club or Organization Meetings: Not on file    Marital Status: Not on file   Intimate Partner Violence:     Fear of Current or Ex-Partner: Not on file    Emotionally Abused: Not on file    Physically Abused: Not on file    Sexually Abused: Not on file   Housing Stability:     Unable to Pay for Housing in the Last Year: Not on file    Number of [place] in the Last Year: Not on file    Unstable Housing in the Last Year: Not on file       OBJECTIVE:  Physical Exam  General: awake, alert, in no acute distress  HEENT: mucous membranes moist  Respiratory: normal effort, no wheezes appreciated  CV: appears well perfused  Abdomen: Soft, nontender, non-distended. Buttock: incision clean and dry, pink, minimal drainage    Skin: warm and dry  Extremities: atraumatic  Neuro: no focal deficits noted  Psych: mood normal      ASSESSMENT:  55 y.o. male s/p I&D of perirectal abscess. Doing well. 1. Postoperative examination        PLAN:  - continue current dressing changes, once packing will not stay in place can switch to dry dressing over the wound daily.   Can leave open to air once no drainage is present on the dressing  - call if problems arise  - ok to return to work Marce Black MD  5/12/2022  9:54 AM

## 2023-01-09 ENCOUNTER — OFFICE VISIT (OUTPATIENT)
Dept: FAMILY MEDICINE CLINIC | Age: 48
End: 2023-01-09
Payer: COMMERCIAL

## 2023-01-09 VITALS
OXYGEN SATURATION: 95 % | DIASTOLIC BLOOD PRESSURE: 80 MMHG | WEIGHT: 284.4 LBS | SYSTOLIC BLOOD PRESSURE: 122 MMHG | HEART RATE: 94 BPM | BODY MASS INDEX: 38.57 KG/M2

## 2023-01-09 DIAGNOSIS — Z12.11 SCREEN FOR COLON CANCER: ICD-10-CM

## 2023-01-09 DIAGNOSIS — E11.9 TYPE 2 DIABETES MELLITUS WITHOUT COMPLICATION, WITH LONG-TERM CURRENT USE OF INSULIN (HCC): Primary | ICD-10-CM

## 2023-01-09 DIAGNOSIS — Z79.4 TYPE 2 DIABETES MELLITUS WITHOUT COMPLICATION, WITH LONG-TERM CURRENT USE OF INSULIN (HCC): Primary | ICD-10-CM

## 2023-01-09 DIAGNOSIS — Z11.59 NEED FOR HEPATITIS C SCREENING TEST: ICD-10-CM

## 2023-01-09 LAB
A/G RATIO: 1.6 (ref 1.1–2.2)
ALBUMIN SERPL-MCNC: 4.2 G/DL (ref 3.4–5)
ALP BLD-CCNC: 137 U/L (ref 40–129)
ALT SERPL-CCNC: 16 U/L (ref 10–40)
ANION GAP SERPL CALCULATED.3IONS-SCNC: 14 MMOL/L (ref 3–16)
AST SERPL-CCNC: 14 U/L (ref 15–37)
BILIRUB SERPL-MCNC: 0.7 MG/DL (ref 0–1)
BUN BLDV-MCNC: 18 MG/DL (ref 7–20)
CALCIUM SERPL-MCNC: 9.4 MG/DL (ref 8.3–10.6)
CHLORIDE BLD-SCNC: 105 MMOL/L (ref 99–110)
CHOLESTEROL, TOTAL: 153 MG/DL (ref 0–199)
CO2: 22 MMOL/L (ref 21–32)
CREAT SERPL-MCNC: 0.7 MG/DL (ref 0.9–1.3)
GFR SERPL CREATININE-BSD FRML MDRD: >60 ML/MIN/{1.73_M2}
GLUCOSE BLD-MCNC: 278 MG/DL (ref 70–99)
HBA1C MFR BLD: 13.6 %
HDLC SERPL-MCNC: 33 MG/DL (ref 40–60)
HEPATITIS C ANTIBODY INTERPRETATION: NORMAL
LDL CHOLESTEROL CALCULATED: 95 MG/DL
POTASSIUM SERPL-SCNC: 4.1 MMOL/L (ref 3.5–5.1)
SODIUM BLD-SCNC: 141 MMOL/L (ref 136–145)
TOTAL PROTEIN: 6.8 G/DL (ref 6.4–8.2)
TRIGL SERPL-MCNC: 123 MG/DL (ref 0–150)
TSH REFLEX: 1.3 UIU/ML (ref 0.27–4.2)
VLDLC SERPL CALC-MCNC: 25 MG/DL

## 2023-01-09 PROCEDURE — G8417 CALC BMI ABV UP PARAM F/U: HCPCS | Performed by: FAMILY MEDICINE

## 2023-01-09 PROCEDURE — 3046F HEMOGLOBIN A1C LEVEL >9.0%: CPT | Performed by: FAMILY MEDICINE

## 2023-01-09 PROCEDURE — 2022F DILAT RTA XM EVC RTNOPTHY: CPT | Performed by: FAMILY MEDICINE

## 2023-01-09 PROCEDURE — 4004F PT TOBACCO SCREEN RCVD TLK: CPT | Performed by: FAMILY MEDICINE

## 2023-01-09 PROCEDURE — 3079F DIAST BP 80-89 MM HG: CPT | Performed by: FAMILY MEDICINE

## 2023-01-09 PROCEDURE — 83036 HEMOGLOBIN GLYCOSYLATED A1C: CPT | Performed by: FAMILY MEDICINE

## 2023-01-09 PROCEDURE — G8427 DOCREV CUR MEDS BY ELIG CLIN: HCPCS | Performed by: FAMILY MEDICINE

## 2023-01-09 PROCEDURE — 36415 COLL VENOUS BLD VENIPUNCTURE: CPT | Performed by: FAMILY MEDICINE

## 2023-01-09 PROCEDURE — 3074F SYST BP LT 130 MM HG: CPT | Performed by: FAMILY MEDICINE

## 2023-01-09 PROCEDURE — G8484 FLU IMMUNIZE NO ADMIN: HCPCS | Performed by: FAMILY MEDICINE

## 2023-01-09 PROCEDURE — 99214 OFFICE O/P EST MOD 30 MIN: CPT | Performed by: FAMILY MEDICINE

## 2023-01-09 RX ORDER — INSULIN GLARGINE 100 [IU]/ML
35 INJECTION, SOLUTION SUBCUTANEOUS DAILY
Qty: 4 ADJUSTABLE DOSE PRE-FILLED PEN SYRINGE | Refills: 0 | Status: SHIPPED | OUTPATIENT
Start: 2023-01-09 | End: 2023-02-08

## 2023-01-09 RX ORDER — FLASH GLUCOSE SENSOR
KIT MISCELLANEOUS
Qty: 2 EACH | Refills: 13 | Status: SHIPPED | OUTPATIENT
Start: 2023-01-09

## 2023-01-09 RX ORDER — PEN NEEDLE, DIABETIC 31 G X1/4"
1 NEEDLE, DISPOSABLE MISCELLANEOUS DAILY
Qty: 100 EACH | Refills: 3 | Status: SHIPPED | OUTPATIENT
Start: 2023-01-09

## 2023-01-09 RX ORDER — FLASH GLUCOSE SCANNING READER
EACH MISCELLANEOUS
Qty: 2 EACH | Refills: 12 | Status: SHIPPED | OUTPATIENT
Start: 2023-01-09

## 2023-01-09 RX ORDER — INSULIN GLARGINE 100 [IU]/ML
35 INJECTION, SOLUTION SUBCUTANEOUS NIGHTLY
Qty: 10 ML | Refills: 0 | Status: SHIPPED | OUTPATIENT
Start: 2023-01-09 | End: 2023-02-08

## 2023-01-09 ASSESSMENT — PATIENT HEALTH QUESTIONNAIRE - PHQ9
SUM OF ALL RESPONSES TO PHQ QUESTIONS 1-9: 0
SUM OF ALL RESPONSES TO PHQ QUESTIONS 1-9: 0
1. LITTLE INTEREST OR PLEASURE IN DOING THINGS: 0
SUM OF ALL RESPONSES TO PHQ QUESTIONS 1-9: 0
SUM OF ALL RESPONSES TO PHQ QUESTIONS 1-9: 0
SUM OF ALL RESPONSES TO PHQ9 QUESTIONS 1 & 2: 0
2. FEELING DOWN, DEPRESSED OR HOPELESS: 0

## 2023-01-09 NOTE — PROGRESS NOTES
Patient ID: Amanda Deluna 1975    . Chief Complaint   Patient presents with    Diabetes         Diabetes  He presents for his follow-up diabetic visit. He has type 2 diabetes mellitus. Associated symptoms include polydipsia. Pertinent negatives for diabetes include no polyuria. There are no hypoglycemic complications. There are no diabetic complications. Risk factors for coronary artery disease include diabetes mellitus, male sex and obesity. Current diabetic treatment includes insulin injections. He is compliant with treatment some of the time. His overall blood glucose range is 180-200 mg/dl. An ACE inhibitor/angiotensin II receptor blocker is not being taken. Patient Active Problem List   Diagnosis    Abscess of buttock, left    Caffeine use    Essential hypertension    Tobacco abuse    Obesity, Class II, BMI 35-39.9       No past surgical history on file. Family History   Problem Relation Age of Onset    Stroke Father     Lung Cancer Maternal Grandfather     Diabetes Maternal Aunt        Current Outpatient Medications on File Prior to Visit   Medication Sig Dispense Refill    lisinopril (PRINIVIL;ZESTRIL) 5 MG tablet Take 1 tablet by mouth daily (Patient not taking: Reported on 1/9/2023) 30 tablet 0     No current facility-administered medications on file prior to visit. Objective:         Physical Exam  Vitals and nursing note reviewed. Constitutional:       Appearance: He is well-developed. HENT:      Head: Normocephalic and atraumatic. Cardiovascular:      Rate and Rhythm: Normal rate and regular rhythm. Heart sounds: Normal heart sounds, S1 normal and S2 normal.   Pulmonary:      Effort: No respiratory distress. Breath sounds: Normal breath sounds. No wheezing or rales. Skin:     General: Skin is warm and dry. Neurological:      Mental Status: He is alert.    Psychiatric:         Mood and Affect: Mood normal.         Speech: Speech normal. Behavior: Behavior normal.         Thought Content: Thought content normal.         Judgment: Judgment normal.     Vitals:    01/09/23 1430   BP: 122/80   Site: Left Upper Arm   Position: Sitting   Cuff Size: Medium Adult   Pulse: 94   SpO2: 95%   Weight: 284 lb 6.4 oz (129 kg)     Body mass index is 38.57 kg/m². Wt Readings from Last 3 Encounters:   01/09/23 284 lb 6.4 oz (129 kg)   05/11/22 291 lb 1.6 oz (132 kg)   05/04/22 290 lb 6.4 oz (131.7 kg)     BP Readings from Last 3 Encounters:   01/09/23 122/80   05/11/22 130/84   05/04/22 (!) 120/98          Results for orders placed or performed in visit on 01/09/23   POCT glycosylated hemoglobin (Hb A1C)   Result Value Ref Range    Hemoglobin A1C 13.6 %     The ASCVD Risk score (Jose Angel VINCENT, et al., 2019) failed to calculate for the following reasons:    Cannot find a previous HDL lab    Cannot find a previous total cholesterol lab  Lab Review   No visits with results within 2 Month(s) from this visit.    Latest known visit with results is:   Admission on 04/29/2022, Discharged on 05/02/2022   Component Date Value    Specimen 04/29/2022 BLOOD     Special Requests 04/29/2022 NONE     Culture 04/29/2022 NO GROWTH AT 5 DAYS     Specimen 04/29/2022 BLOOD     Special Requests 04/29/2022 NONE     Culture 04/29/2022 NO GROWTH AT 5 DAYS     Lactate 04/29/2022 1.5     Sodium 04/29/2022 132 (A)     Potassium 04/29/2022 4.0     Chloride 04/29/2022 99     CO2 04/29/2022 17 (A)     BUN 04/29/2022 14     Creatinine 04/29/2022 0.7 (A)     Glucose 04/29/2022 439 (A)     Calcium 04/29/2022 8.8     Albumin 04/29/2022 3.6     Total Protein 04/29/2022 7.2     Total Bilirubin 04/29/2022 0.5     ALT 04/29/2022 21     AST 04/29/2022 14 (A)     Alkaline Phosphatase 04/29/2022 140 (A)     GFR Non- 04/29/2022 >60     GFR  04/29/2022 >60     Anion Gap 04/29/2022 16     WBC 04/29/2022 16.7 (A)     RBC 04/29/2022 5.28     Hemoglobin 04/29/2022 15.6     Hematocrit 04/29/2022 46.8     MCV 04/29/2022 88.6     MCH 04/29/2022 29.5     MCHC 04/29/2022 33.3     RDW 04/29/2022 11.8     Platelets 17/88/4647 315     MPV 04/29/2022 10.5     Differential Type 04/29/2022 AUTOMATED DIFFERENTIAL     Segs Relative 04/29/2022 76.2 (A)     Lymphocytes % 04/29/2022 13.9 (A)     Monocytes % 04/29/2022 8.0 (A)     Eosinophils % 04/29/2022 0.5     Basophils % 04/29/2022 0.6     Segs Absolute 04/29/2022 12.7     Lymphocytes Absolute 04/29/2022 2.3     Monocytes Absolute 04/29/2022 1.3     Eosinophils Absolute 04/29/2022 0.1     Basophils Absolute 04/29/2022 0.1     Nucleated RBC % 04/29/2022 0.0     Total Nucleated RBC 04/29/2022 0.0     Total Immature Neutrophil 04/29/2022 0.14     Immature Neutrophil % 04/29/2022 0.8 (A)     Specimen 04/30/2022 ABSCESS     Special Requests 04/30/2022 NONE     Culture 04/30/2022 Final Report Mixed anaerobic growth present  Moderate growth     Culture 04/30/2022 BETA STREP GROUP C Light growth Susceptibility testing of penicillin and other beta lactams is not necessary for beta hemolytic Streptococci since resistant strains have not been identified.  (CLSI M100) (A)     Ventricular Rate 04/29/2022 151     Atrial Rate 04/29/2022 151     P-R Interval 04/29/2022 116     QRS Duration 04/29/2022 86     Q-T Interval 04/29/2022 278     QTc Calculation (Bazett) 04/29/2022 440     P Axis 04/29/2022 21     R Axis 04/29/2022 64     T [place] 04/29/2022 7     Diagnosis 04/29/2022                      Value:Sinus tachycardia  Otherwise normal ECG  No previous ECGs available  Confirmed by Sulema Romberg ([id number]) on 5/1/2022 9:39:17 AM      POC Glucose 04/30/2022 251 (A)     POC Glucose 04/30/2022 255 (A)     Specimen 04/30/2022 BUTTOCKS     Special Requests 04/30/2022 Perianal abscess     Culture 04/30/2022 Final Report     Culture 04/30/2022 BETA STREP GROUP C Light growth Susceptibility testing of penicillin and other beta lactams is not necessary for beta hemolytic Streptococci since resistant strains have not been identified. (CLSI M100) (A)     POC Glucose 04/30/2022 325 (A)     Sodium 05/01/2022 133 (A)     Potassium 05/01/2022 4.3     Chloride 05/01/2022 100     CO2 05/01/2022 18 (A)     Anion Gap 05/01/2022 15     BUN 05/01/2022 11     Creatinine 05/01/2022 0.5 (A)     Glucose 05/01/2022 302 (A)     Calcium 05/01/2022 8.0 (A)     GFR Non- 05/01/2022 >60     GFR  05/01/2022 >60     WBC 05/01/2022 10.9 (A)     RBC 05/01/2022 4.69     Hemoglobin 05/01/2022 13.9     Hematocrit 05/01/2022 41.6 (A)     MCV 05/01/2022 88.7     MCH 05/01/2022 29.6     MCHC 05/01/2022 33.4     RDW 05/01/2022 12.0     Platelets 02/65/7199 333     MPV 05/01/2022 10.5     Differential Type 05/01/2022 AUTOMATED DIFFERENTIAL     Segs Relative 05/01/2022 70.7 (A)     Lymphocytes % 05/01/2022 18.9 (A)     Monocytes % 05/01/2022 5.9 (A)     Eosinophils % 05/01/2022 1.2     Basophils % 05/01/2022 1.0     Segs Absolute 05/01/2022 7.7     Lymphocytes Absolute 05/01/2022 2.1     Monocytes Absolute 05/01/2022 0.7     Eosinophils Absolute 05/01/2022 0.1     Basophils Absolute 05/01/2022 0.1     Nucleated RBC % 05/01/2022 0.0     Total Nucleated RBC 05/01/2022 0.0     Total Immature Neutrophil 05/01/2022 0.25     Immature Neutrophil % 05/01/2022 2.3 (A)     Vancomycin Rm 05/01/2022 8.9     DOSE AMOUNT 05/01/2022 DOSE AMT.  GIVEN - 1500mg     DOSE TIME 05/01/2022 DOSE TIME GIVEN - iv q12hr     POC Glucose 04/30/2022 245 (A)     POC Glucose 05/01/2022 212 (A)     POC Glucose 05/01/2022 308 (A)     Hemoglobin A1C 05/01/2022 12.0 (A)     eAG 05/01/2022 298     POC Glucose 05/01/2022 210 (A)     POC Glucose 05/01/2022 231 (A)     WBC 05/02/2022 11.2 (A)     RBC 05/02/2022 4.57 (A)     Hemoglobin 05/02/2022 13.7     Hematocrit 05/02/2022 40.9 (A)     MCV 05/02/2022 89.5     MCH 05/02/2022 30.0     MCHC 05/02/2022 33.5     RDW 05/02/2022 11.9     Platelets 69/55/4091 358     MPV 05/02/2022 10.0 Differential Type 05/02/2022 AUTOMATED DIFFERENTIAL     Segs Relative 05/02/2022 63.9     Lymphocytes % 05/02/2022 23.0 (A)     Monocytes % 05/02/2022 7.4 (A)     Eosinophils % 05/02/2022 2.0     Basophils % 05/02/2022 1.2 (A)     Segs Absolute 05/02/2022 7.2     Lymphocytes Absolute 05/02/2022 2.6     Monocytes Absolute 05/02/2022 0.8     Eosinophils Absolute 05/02/2022 0.2     Basophils Absolute 05/02/2022 0.1     Nucleated RBC % 05/02/2022 0.0     Total Nucleated RBC 05/02/2022 0.0     Total Immature Neutrophil 05/02/2022 0.28     Immature Neutrophil % 05/02/2022 2.5 (A)     Sodium 05/02/2022 139     Potassium 05/02/2022 3.8     Chloride 05/02/2022 106     CO2 05/02/2022 23     Anion Gap 05/02/2022 10     BUN 05/02/2022 10     Creatinine 05/02/2022 0.5 (A)     Glucose 05/02/2022 181 (A)     Calcium 05/02/2022 8.0 (A)     GFR Non- 05/02/2022 >60     GFR  05/02/2022 >60     POC Glucose 05/01/2022 158 (A)     POC Glucose 05/02/2022 191 (A)     POC Glucose 05/02/2022 257 (A)     POC Glucose 05/02/2022 203 (A)            Assessment:       Diagnosis Orders   1. Type 2 diabetes mellitus without complication, with long-term current use of insulin (HCC)  Lipid Panel    Microalbumin / Creatinine Urine Ratio    TSH with Reflex    Comprehensive Metabolic Panel    POCT glycosylated hemoglobin (Hb A1C)    insulin glargine (LANTUS SOLOSTAR) 100 UNIT/ML injection pen    Insulin Pen Needle ([employer] PEN NEEDLES) 31G X 6 MM MISC    Continuous Blood Gluc  (FREESTYLE JOHN 2 READER) SELENA    Continuous Blood Gluc Sensor (FREESTYLE JOHN 2 SENSOR) MISC      2. Need for hepatitis C screening test  Hepatitis C Antibody      3. Screen for colon cancer                Plan:      Diabetes uncontrolled due to noncompliance. Restart the Lantus at 35 units and recheck in 2 weeks. He is to bring back blood sugar readings at least 3 to 4/day.       Type of CGM ordered: Freestyle John 2 14 day    Documentation of patient necessity is as follows:    Number of daily insulin injection(s): one to two  Patient is prescribed to check home glucose 4 times per day. Most recent A1C value: 13    Range of glucose levels: 180-200+    How many hypoglycemia events has occurred in the last month: 0    Is the patient hypo unaware? No    Is the patient actively participating in a care plan to manage their diabetes: Yes    This patient is cognitively able to use this technology? Yes    Does this patient have any other medical issues that would require a CGM vs traditional glucometer testing at home (dexterity, amputation, visual deficit, etc)? no    Patient is due for colon cancer screening. .  Is preferring to have stool testing done rather than get a colonoscopy. The patient does not have a family history of colon cancer and does not have bleeding when they have bowel movements. Therefore, fit test  was given to patient. The patient will be contacted in 2 weeks if the fit test has not been returned. If the fit test is positive, then the patient will be referred for a colonoscopy. Return in about 2 weeks (around 1/23/2023) for DM, F.card/ MA 1-2 weeks, Bring meds.

## 2023-01-10 LAB
CREATININE URINE: 186.9 MG/DL (ref 39–259)
MICROALBUMIN UR-MCNC: 5 MG/DL
MICROALBUMIN/CREAT UR-RTO: 26.8 MG/G (ref 0–30)

## 2023-01-16 ENCOUNTER — NURSE ONLY (OUTPATIENT)
Dept: FAMILY MEDICINE CLINIC | Age: 48
End: 2023-01-16
Payer: COMMERCIAL

## 2023-01-16 DIAGNOSIS — Z12.11 COLON CANCER SCREENING: Primary | ICD-10-CM

## 2023-01-16 LAB
CONTROL: NORMAL
HEMOCCULT STL QL: NORMAL

## 2023-01-16 PROCEDURE — 82274 ASSAY TEST FOR BLOOD FECAL: CPT | Performed by: FAMILY MEDICINE

## 2023-01-24 ENCOUNTER — OFFICE VISIT (OUTPATIENT)
Dept: FAMILY MEDICINE CLINIC | Age: 48
End: 2023-01-24
Payer: COMMERCIAL

## 2023-01-24 VITALS
HEART RATE: 100 BPM | HEIGHT: 72 IN | DIASTOLIC BLOOD PRESSURE: 78 MMHG | BODY MASS INDEX: 39.01 KG/M2 | SYSTOLIC BLOOD PRESSURE: 128 MMHG | WEIGHT: 288 LBS

## 2023-01-24 DIAGNOSIS — Z79.4 TYPE 2 DIABETES MELLITUS WITHOUT COMPLICATION, WITH LONG-TERM CURRENT USE OF INSULIN (HCC): Primary | ICD-10-CM

## 2023-01-24 DIAGNOSIS — E11.9 TYPE 2 DIABETES MELLITUS WITHOUT COMPLICATION, WITH LONG-TERM CURRENT USE OF INSULIN (HCC): Primary | ICD-10-CM

## 2023-01-24 PROCEDURE — 99213 OFFICE O/P EST LOW 20 MIN: CPT | Performed by: FAMILY MEDICINE

## 2023-01-24 PROCEDURE — G8417 CALC BMI ABV UP PARAM F/U: HCPCS | Performed by: FAMILY MEDICINE

## 2023-01-24 PROCEDURE — 4004F PT TOBACCO SCREEN RCVD TLK: CPT | Performed by: FAMILY MEDICINE

## 2023-01-24 PROCEDURE — G8427 DOCREV CUR MEDS BY ELIG CLIN: HCPCS | Performed by: FAMILY MEDICINE

## 2023-01-24 PROCEDURE — G8484 FLU IMMUNIZE NO ADMIN: HCPCS | Performed by: FAMILY MEDICINE

## 2023-01-24 PROCEDURE — 3078F DIAST BP <80 MM HG: CPT | Performed by: FAMILY MEDICINE

## 2023-01-24 PROCEDURE — 3046F HEMOGLOBIN A1C LEVEL >9.0%: CPT | Performed by: FAMILY MEDICINE

## 2023-01-24 PROCEDURE — 2022F DILAT RTA XM EVC RTNOPTHY: CPT | Performed by: FAMILY MEDICINE

## 2023-01-24 PROCEDURE — 3074F SYST BP LT 130 MM HG: CPT | Performed by: FAMILY MEDICINE

## 2023-01-24 RX ORDER — INSULIN GLARGINE 100 [IU]/ML
35 INJECTION, SOLUTION SUBCUTANEOUS DAILY
Qty: 4 ADJUSTABLE DOSE PRE-FILLED PEN SYRINGE | Refills: 0 | Status: SHIPPED | OUTPATIENT
Start: 2023-01-24 | End: 2023-02-23

## 2023-01-24 RX ORDER — PEN NEEDLE, DIABETIC 31 G X1/4"
1 NEEDLE, DISPOSABLE MISCELLANEOUS 2 TIMES DAILY
Qty: 100 EACH | Refills: 3 | Status: SHIPPED | OUTPATIENT
Start: 2023-01-24

## 2023-01-24 RX ORDER — INSULIN LISPRO 100 [IU]/ML
15 INJECTION, SOLUTION INTRAVENOUS; SUBCUTANEOUS
Qty: 2 ADJUSTABLE DOSE PRE-FILLED PEN SYRINGE | Refills: 0 | Status: SHIPPED | OUTPATIENT
Start: 2023-01-24 | End: 2023-02-23

## 2023-01-24 NOTE — PROGRESS NOTES
Patient ID: Ruma Hansen 1975    Chief Complaint   Patient presents with    Diabetes     Patient forgot to bring in sugar readings. Status of [facility] Diabetic Supplies  1/13 · 1:52PM  This order is pending payer authorization. Diabetes  He presents for his follow-up diabetic visit. He has type 2 diabetes mellitus. Current diabetic treatment includes insulin injections. He is compliant with treatment most of the time. When asked about meal planning, he reported none. Home blood sugar record trend: Forgot to bring his sugar readings. (100-300. Higher if eats pasta. Does not eat regular meals at all.  )       Patient Active Problem List   Diagnosis    Abscess of buttock, left    Caffeine use    Essential hypertension    Tobacco abuse    Obesity, Class II, BMI 35-39.9       No past surgical history on file. Family History   Problem Relation Age of Onset    Stroke Father     Lung Cancer Maternal Grandfather     Diabetes Maternal Aunt        Current Outpatient Medications on File Prior to Visit   Medication Sig Dispense Refill    Continuous Blood Gluc  (FREESTYLE JOHN 2 READER) SELENA Use 5 times per day (Patient not taking: Reported on 1/24/2023) 2 each 12    Continuous Blood Gluc Sensor (FREESTYLE JOHN 2 SENSOR) MISC Use 5 times per day (Patient not taking: Reported on 1/24/2023) 2 each 13    lisinopril (PRINIVIL;ZESTRIL) 5 MG tablet Take 1 tablet by mouth daily (Patient not taking: No sig reported) 30 tablet 0     No current facility-administered medications on file prior to visit. Objective:           Physical Exam  Constitutional:       Appearance: He is obese. Skin:     Comments: Smells of tobacco     Vitals:    01/24/23 1506   BP: 128/78   Site: Left Upper Arm   Position: Sitting   Cuff Size: Large Adult   Pulse: 100   Weight: 288 lb (130.6 kg)   Height: 6' (1.829 m)     Body mass index is 39.06 kg/m².      Wt Readings from Last 3 Encounters: 01/24/23 288 lb (130.6 kg)   01/09/23 284 lb 6.4 oz (129 kg)   05/11/22 291 lb 1.6 oz (132 kg)     BP Readings from Last 3 Encounters:   01/24/23 128/78   01/09/23 122/80   05/11/22 130/84          No results found for this visit on 01/24/23. Assessment:       Diagnosis Orders   1. Type 2 diabetes mellitus without complication, with long-term current use of insulin (HCC)  insulin lispro, 1 Unit Dial, (HUMALOG KWIKPEN) 100 UNIT/ML SOPN    Insulin Pen Needle ([employer] PEN NEEDLES) 31G X 6 MM MISC    insulin glargine (LANTUS SOLOSTAR) 100 UNIT/ML injection pen    [facility] (Ambulatory)              Plan:      Patient works third shift and it was very difficult to figure out his eating pattern. Since it sounds like his largest meal is when he gets home from work, I am asking him to add on 15 units of mealtime insulin at this time. Hopefully by then, we will have the freestyle adam approved and can adjust his insulin better. Will further work on counseling for nutrition. Return in about 3 weeks (around 2/14/2023) for DM, Bring sugars.

## 2023-02-14 ENCOUNTER — OFFICE VISIT (OUTPATIENT)
Dept: FAMILY MEDICINE CLINIC | Age: 48
End: 2023-02-14
Payer: COMMERCIAL

## 2023-02-14 VITALS
SYSTOLIC BLOOD PRESSURE: 118 MMHG | HEART RATE: 97 BPM | DIASTOLIC BLOOD PRESSURE: 80 MMHG | HEIGHT: 72 IN | WEIGHT: 282 LBS | BODY MASS INDEX: 38.19 KG/M2

## 2023-02-14 DIAGNOSIS — Z79.4 TYPE 2 DIABETES MELLITUS WITHOUT COMPLICATION, WITH LONG-TERM CURRENT USE OF INSULIN (HCC): Primary | ICD-10-CM

## 2023-02-14 DIAGNOSIS — E11.9 TYPE 2 DIABETES MELLITUS WITHOUT COMPLICATION, WITH LONG-TERM CURRENT USE OF INSULIN (HCC): Primary | ICD-10-CM

## 2023-02-14 PROCEDURE — 3074F SYST BP LT 130 MM HG: CPT | Performed by: FAMILY MEDICINE

## 2023-02-14 PROCEDURE — 2022F DILAT RTA XM EVC RTNOPTHY: CPT | Performed by: FAMILY MEDICINE

## 2023-02-14 PROCEDURE — 95251 CONT GLUC MNTR ANALYSIS I&R: CPT | Performed by: FAMILY MEDICINE

## 2023-02-14 PROCEDURE — G8427 DOCREV CUR MEDS BY ELIG CLIN: HCPCS | Performed by: FAMILY MEDICINE

## 2023-02-14 PROCEDURE — 99213 OFFICE O/P EST LOW 20 MIN: CPT | Performed by: FAMILY MEDICINE

## 2023-02-14 PROCEDURE — 4004F PT TOBACCO SCREEN RCVD TLK: CPT | Performed by: FAMILY MEDICINE

## 2023-02-14 PROCEDURE — 3046F HEMOGLOBIN A1C LEVEL >9.0%: CPT | Performed by: FAMILY MEDICINE

## 2023-02-14 PROCEDURE — G8417 CALC BMI ABV UP PARAM F/U: HCPCS | Performed by: FAMILY MEDICINE

## 2023-02-14 PROCEDURE — 3079F DIAST BP 80-89 MM HG: CPT | Performed by: FAMILY MEDICINE

## 2023-02-14 PROCEDURE — G8484 FLU IMMUNIZE NO ADMIN: HCPCS | Performed by: FAMILY MEDICINE

## 2023-02-14 RX ORDER — INSULIN LISPRO 100 [IU]/ML
INJECTION, SOLUTION INTRAVENOUS; SUBCUTANEOUS
Qty: 2 ADJUSTABLE DOSE PRE-FILLED PEN SYRINGE | Refills: 3 | Status: SHIPPED | OUTPATIENT
Start: 2023-02-14 | End: 2023-03-16

## 2023-02-14 RX ORDER — INSULIN GLARGINE 100 [IU]/ML
35 INJECTION, SOLUTION SUBCUTANEOUS DAILY
Qty: 4 ADJUSTABLE DOSE PRE-FILLED PEN SYRINGE | Refills: 3 | Status: SHIPPED | OUTPATIENT
Start: 2023-02-14 | End: 2023-03-16

## 2023-02-14 RX ORDER — ATORVASTATIN CALCIUM 40 MG/1
40 TABLET, FILM COATED ORAL DAILY
Qty: 90 TABLET | Refills: 3 | Status: SHIPPED | OUTPATIENT
Start: 2023-02-14

## 2023-02-14 RX ORDER — ASPIRIN 81 MG/1
81 TABLET ORAL DAILY
Qty: 90 TABLET | Refills: 3 | Status: SHIPPED | OUTPATIENT
Start: 2023-02-14

## 2023-02-14 RX ORDER — INSULIN LISPRO 100 [IU]/ML
15 INJECTION, SOLUTION INTRAVENOUS; SUBCUTANEOUS
Qty: 2 ADJUSTABLE DOSE PRE-FILLED PEN SYRINGE | Refills: 3 | Status: SHIPPED | OUTPATIENT
Start: 2023-02-14 | End: 2023-02-14 | Stop reason: SDUPTHER

## 2023-02-14 NOTE — PROGRESS NOTES
Patient ID: Lolly Lewis 1975    . Chief Complaint   Patient presents with    Diabetes         Diabetes  He presents for his follow-up diabetic visit. He has type 2 diabetes mellitus. Current diabetic treatment includes insulin injections. He is compliant with treatment most of the time. When asked about meal planning, he reported none. Home blood sugar record trend: Average sugar for the last 14 days is 155. His dinner blood glucose is taken between 6-7 pm. His dinner blood glucose range is generally 180-200 mg/dl. (Average sugar is 155 over the last 14 days. Average sugar 3 AM is 134. Average sugar 12 AM is 161.)       Patient Active Problem List   Diagnosis    Abscess of buttock, left    Caffeine use    Essential hypertension    Tobacco abuse    Obesity, Class II, BMI 35-39.9       No past surgical history on file. Family History   Problem Relation Age of Onset    Stroke Father     Lung Cancer Maternal Grandfather     Diabetes Maternal Aunt        Current Outpatient Medications on File Prior to Visit   Medication Sig Dispense Refill    Insulin Pen Needle ([employer] PEN NEEDLES) 31G X 6 MM MISC 1 each by Does not apply route 2 times daily 100 each 3    Continuous Blood Gluc  (FREESTYLE JOHN 2 READER) SELENA Use 5 times per day (Patient not taking: Reported on 1/24/2023) 2 each 12    Continuous Blood Gluc Sensor (FREESTYLE JOHN 2 SENSOR) MISC Use 5 times per day (Patient not taking: Reported on 1/24/2023) 2 each 13     No current facility-administered medications on file prior to visit. Objective:         Physical Exam  Constitutional:       Appearance: He is well-developed. Psychiatric:         Mood and Affect: Mood normal.         Behavior: Behavior normal.         Thought Content:  Thought content normal.         Judgment: Judgment normal.     Vitals:    02/14/23 0807   BP: 118/80   Site: Left Upper Arm   Position: Sitting   Cuff Size: Large Adult   Pulse: 97   Weight: 282 lb (127.9 kg)   Height: 6' (1.829 m)     Body mass index is 38.25 kg/m². Wt Readings from Last 3 Encounters:   02/14/23 282 lb (127.9 kg)   01/24/23 288 lb (130.6 kg)   01/09/23 284 lb 6.4 oz (129 kg)     BP Readings from Last 3 Encounters:   02/14/23 118/80   01/24/23 128/78   01/09/23 122/80          No results found for this visit on 02/14/23. The 10-year ASCVD risk score (Jose Angel VINCENT, et al., 2019) is: 10.9%    Values used to calculate the score:      Age: 52 years      Sex: Male      Is Non- : No      Diabetic: Yes      Tobacco smoker: Yes      Systolic Blood Pressure: 573 mmHg      Is BP treated: No      HDL Cholesterol: 33 mg/dL      Total Cholesterol: 153 mg/dL  Lab Review   Nurse Only on 01/16/2023   Component Date Value    Occult Blood Fecal 01/16/2023 neg    Office Visit on 01/09/2023   Component Date Value    Hep C Ab Interp 01/09/2023 Non-reactive     Cholesterol, Total 01/09/2023 153     Triglycerides 01/09/2023 123     HDL 01/09/2023 33 (A)     LDL Calculated 01/09/2023 95     VLDL Cholesterol Calcula* 01/09/2023 25     Microalbumin, Random Uri* 01/09/2023 5.00 (A)     Creatinine, Ur 01/09/2023 186.9     Microalbumin Creatinine * 01/09/2023 26.8     TSH 01/09/2023 1.30     Sodium 01/09/2023 141     Potassium 01/09/2023 4.1     Chloride 01/09/2023 105     CO2 01/09/2023 22     Anion Gap 01/09/2023 14     Glucose 01/09/2023 278 (A)     BUN 01/09/2023 18     Creatinine 01/09/2023 0.7 (A)     Est, Glom Filt Rate 01/09/2023 >60     Calcium 01/09/2023 9.4     Total Protein 01/09/2023 6.8     Albumin 01/09/2023 4.2     Albumin/Globulin Ratio 01/09/2023 1.6     Total Bilirubin 01/09/2023 0.7     Alkaline Phosphatase 01/09/2023 137 (A)     ALT 01/09/2023 16     AST 01/09/2023 14 (A)     Hemoglobin A1C 01/09/2023 13.6            Assessment:       Diagnosis Orders   1.  Type 2 diabetes mellitus without complication, with long-term current use of insulin (HCC)  atorvastatin (LIPITOR) 40 MG tablet    aspirin EC 81 MG EC tablet    insulin glargine (LANTUS SOLOSTAR) 100 UNIT/ML injection pen    insulin lispro, 1 Unit Dial, (HUMALOG KWIKPEN) 100 UNIT/ML SOPN    IN CONTINUOUS GLUCOSE MONITORING ANALYSIS I&R    DISCONTINUED: insulin lispro, 1 Unit Dial, (HUMALOG KWIKPEN) 100 UNIT/ML SOPN              Plan:      Diabetes sugars much improved. Would recommend 5 units of mealtime insulin if he eats breakfast which for him is between 6 PM and 9 PM.  Otherwise recheck in 3 months. Adding on statin and aspirin for cardioprotection. Congratulations on the weight loss and improve sugar readings. Patient declined a prescription for glucose tablets      Return in about 3 months (around 5/14/2023) for DM.

## 2023-02-20 DIAGNOSIS — E11.9 TYPE 2 DIABETES MELLITUS WITHOUT COMPLICATION, WITH LONG-TERM CURRENT USE OF INSULIN (HCC): ICD-10-CM

## 2023-02-20 DIAGNOSIS — Z79.4 TYPE 2 DIABETES MELLITUS WITHOUT COMPLICATION, WITH LONG-TERM CURRENT USE OF INSULIN (HCC): ICD-10-CM

## 2023-02-21 RX ORDER — INSULIN LISPRO 100 [IU]/ML
INJECTION, SOLUTION INTRAVENOUS; SUBCUTANEOUS
Qty: 6 ML | OUTPATIENT
Start: 2023-02-21

## 2023-03-06 DIAGNOSIS — E11.9 TYPE 2 DIABETES MELLITUS WITHOUT COMPLICATION, WITH LONG-TERM CURRENT USE OF INSULIN (HCC): ICD-10-CM

## 2023-03-06 DIAGNOSIS — Z79.4 TYPE 2 DIABETES MELLITUS WITHOUT COMPLICATION, WITH LONG-TERM CURRENT USE OF INSULIN (HCC): ICD-10-CM

## 2023-03-06 RX ORDER — INSULIN GLARGINE 100 [IU]/ML
INJECTION, SOLUTION SUBCUTANEOUS
Qty: 12 ML | OUTPATIENT
Start: 2023-03-06